# Patient Record
Sex: FEMALE | Race: BLACK OR AFRICAN AMERICAN | Employment: OTHER | URBAN - METROPOLITAN AREA
[De-identification: names, ages, dates, MRNs, and addresses within clinical notes are randomized per-mention and may not be internally consistent; named-entity substitution may affect disease eponyms.]

---

## 2017-01-01 ENCOUNTER — HOME CARE VISIT (OUTPATIENT)
Dept: HOSPICE | Facility: HOSPICE | Age: 82
End: 2017-01-01
Payer: MEDICARE

## 2017-01-01 ENCOUNTER — HOME CARE VISIT (OUTPATIENT)
Dept: SCHEDULING | Facility: HOME HEALTH | Age: 82
End: 2017-01-01
Payer: MEDICARE

## 2017-01-01 ENCOUNTER — HOSPITAL ENCOUNTER (INPATIENT)
Age: 82
LOS: 5 days | Discharge: HOME HEALTH | DRG: 190 | End: 2017-02-15
Attending: EMERGENCY MEDICINE | Admitting: FAMILY MEDICINE
Payer: MEDICARE

## 2017-01-01 ENCOUNTER — APPOINTMENT (OUTPATIENT)
Dept: GENERAL RADIOLOGY | Age: 82
DRG: 190 | End: 2017-01-01
Attending: EMERGENCY MEDICINE
Payer: MEDICARE

## 2017-01-01 ENCOUNTER — HOME CARE VISIT (OUTPATIENT)
Dept: HOME HEALTH SERVICES | Facility: HOME HEALTH | Age: 82
End: 2017-01-01
Payer: MEDICARE

## 2017-01-01 ENCOUNTER — OFFICE VISIT (OUTPATIENT)
Dept: CARDIOLOGY CLINIC | Age: 82
End: 2017-01-01

## 2017-01-01 ENCOUNTER — HOSPITAL ENCOUNTER (INPATIENT)
Age: 82
LOS: 4 days | Discharge: HOSPICE | DRG: 640 | End: 2017-04-15
Attending: EMERGENCY MEDICINE | Admitting: FAMILY MEDICINE
Payer: MEDICARE

## 2017-01-01 ENCOUNTER — HOME HEALTH ADMISSION (OUTPATIENT)
Dept: HOME HEALTH SERVICES | Facility: HOME HEALTH | Age: 82
End: 2017-01-01
Payer: MEDICARE

## 2017-01-01 ENCOUNTER — APPOINTMENT (OUTPATIENT)
Dept: GENERAL RADIOLOGY | Age: 82
DRG: 640 | End: 2017-01-01
Attending: EMERGENCY MEDICINE
Payer: MEDICARE

## 2017-01-01 ENCOUNTER — HOSPICE ADMISSION (OUTPATIENT)
Dept: HOSPICE | Facility: HOSPICE | Age: 82
End: 2017-01-01
Payer: MEDICARE

## 2017-01-01 VITALS — SYSTOLIC BLOOD PRESSURE: 133 MMHG | HEART RATE: 78 BPM | DIASTOLIC BLOOD PRESSURE: 72 MMHG | OXYGEN SATURATION: 95 %

## 2017-01-01 VITALS
SYSTOLIC BLOOD PRESSURE: 149 MMHG | OXYGEN SATURATION: 97 % | RESPIRATION RATE: 18 BRPM | SYSTOLIC BLOOD PRESSURE: 163 MMHG | RESPIRATION RATE: 18 BRPM | DIASTOLIC BLOOD PRESSURE: 81 MMHG | HEART RATE: 70 BPM | HEART RATE: 75 BPM | TEMPERATURE: 98.1 F | OXYGEN SATURATION: 99 % | DIASTOLIC BLOOD PRESSURE: 78 MMHG | TEMPERATURE: 97.9 F

## 2017-01-01 VITALS
SYSTOLIC BLOOD PRESSURE: 152 MMHG | RESPIRATION RATE: 18 BRPM | OXYGEN SATURATION: 99 % | HEART RATE: 80 BPM | DIASTOLIC BLOOD PRESSURE: 82 MMHG | TEMPERATURE: 97.8 F

## 2017-01-01 VITALS
HEIGHT: 65 IN | OXYGEN SATURATION: 87 % | DIASTOLIC BLOOD PRESSURE: 90 MMHG | RESPIRATION RATE: 16 BRPM | DIASTOLIC BLOOD PRESSURE: 50 MMHG | SYSTOLIC BLOOD PRESSURE: 140 MMHG | WEIGHT: 89 LBS | TEMPERATURE: 98.4 F | HEART RATE: 67 BPM | HEART RATE: 60 BPM | OXYGEN SATURATION: 96 % | BODY MASS INDEX: 14.83 KG/M2 | SYSTOLIC BLOOD PRESSURE: 102 MMHG

## 2017-01-01 VITALS
OXYGEN SATURATION: 98 % | TEMPERATURE: 98 F | SYSTOLIC BLOOD PRESSURE: 110 MMHG | HEART RATE: 64 BPM | DIASTOLIC BLOOD PRESSURE: 82 MMHG

## 2017-01-01 VITALS — DIASTOLIC BLOOD PRESSURE: 82 MMHG | SYSTOLIC BLOOD PRESSURE: 161 MMHG | HEART RATE: 78 BPM | OXYGEN SATURATION: 95 %

## 2017-01-01 VITALS
HEIGHT: 65 IN | HEART RATE: 60 BPM | DIASTOLIC BLOOD PRESSURE: 70 MMHG | TEMPERATURE: 97.8 F | RESPIRATION RATE: 18 BRPM | BODY MASS INDEX: 15.6 KG/M2 | SYSTOLIC BLOOD PRESSURE: 154 MMHG | OXYGEN SATURATION: 94 % | WEIGHT: 93.6 LBS

## 2017-01-01 VITALS
HEART RATE: 63 BPM | TEMPERATURE: 97.7 F | SYSTOLIC BLOOD PRESSURE: 131 MMHG | WEIGHT: 94.6 LBS | DIASTOLIC BLOOD PRESSURE: 65 MMHG | BODY MASS INDEX: 15.76 KG/M2 | HEIGHT: 65 IN | OXYGEN SATURATION: 100 % | RESPIRATION RATE: 20 BRPM

## 2017-01-01 VITALS — HEART RATE: 77 BPM | DIASTOLIC BLOOD PRESSURE: 81 MMHG | SYSTOLIC BLOOD PRESSURE: 150 MMHG | OXYGEN SATURATION: 99 %

## 2017-01-01 VITALS
OXYGEN SATURATION: 98 % | HEART RATE: 72 BPM | HEART RATE: 75 BPM | SYSTOLIC BLOOD PRESSURE: 125 MMHG | OXYGEN SATURATION: 95 % | SYSTOLIC BLOOD PRESSURE: 122 MMHG | DIASTOLIC BLOOD PRESSURE: 75 MMHG | DIASTOLIC BLOOD PRESSURE: 81 MMHG

## 2017-01-01 VITALS — DIASTOLIC BLOOD PRESSURE: 78 MMHG | SYSTOLIC BLOOD PRESSURE: 117 MMHG | OXYGEN SATURATION: 97 % | HEART RATE: 77 BPM

## 2017-01-01 VITALS
DIASTOLIC BLOOD PRESSURE: 76 MMHG | SYSTOLIC BLOOD PRESSURE: 98 MMHG | HEART RATE: 48 BPM | OXYGEN SATURATION: 78 % | RESPIRATION RATE: 12 BRPM

## 2017-01-01 VITALS — HEART RATE: 65 BPM | OXYGEN SATURATION: 96 % | DIASTOLIC BLOOD PRESSURE: 81 MMHG | SYSTOLIC BLOOD PRESSURE: 138 MMHG

## 2017-01-01 VITALS
HEART RATE: 74 BPM | SYSTOLIC BLOOD PRESSURE: 130 MMHG | HEIGHT: 65 IN | DIASTOLIC BLOOD PRESSURE: 60 MMHG | OXYGEN SATURATION: 98 %

## 2017-01-01 VITALS — OXYGEN SATURATION: 98 % | HEART RATE: 64 BPM | SYSTOLIC BLOOD PRESSURE: 138 MMHG | DIASTOLIC BLOOD PRESSURE: 62 MMHG

## 2017-01-01 VITALS
TEMPERATURE: 98.6 F | SYSTOLIC BLOOD PRESSURE: 110 MMHG | RESPIRATION RATE: 20 BRPM | DIASTOLIC BLOOD PRESSURE: 60 MMHG | HEART RATE: 76 BPM

## 2017-01-01 VITALS — DIASTOLIC BLOOD PRESSURE: 78 MMHG | SYSTOLIC BLOOD PRESSURE: 117 MMHG | OXYGEN SATURATION: 97 % | HEART RATE: 71 BPM

## 2017-01-01 VITALS — HEART RATE: 67 BPM | OXYGEN SATURATION: 95 % | DIASTOLIC BLOOD PRESSURE: 90 MMHG | SYSTOLIC BLOOD PRESSURE: 126 MMHG

## 2017-01-01 VITALS — HEART RATE: 89 BPM | DIASTOLIC BLOOD PRESSURE: 72 MMHG | SYSTOLIC BLOOD PRESSURE: 135 MMHG | RESPIRATION RATE: 20 BRPM

## 2017-01-01 DIAGNOSIS — R06.02 SOB (SHORTNESS OF BREATH): ICD-10-CM

## 2017-01-01 DIAGNOSIS — J84.10 FIBROTIC LUNG DISEASES (HCC): ICD-10-CM

## 2017-01-01 DIAGNOSIS — R26.2 INABILITY TO AMBULATE DUE TO MULTIPLE JOINTS: ICD-10-CM

## 2017-01-01 DIAGNOSIS — I42.0 DILATED CARDIOMYOPATHY (HCC): Primary | ICD-10-CM

## 2017-01-01 DIAGNOSIS — I45.9 STOKES-ADAMS SYNCOPE: ICD-10-CM

## 2017-01-01 DIAGNOSIS — I27.20 PULMONARY HYPERTENSION (HCC): ICD-10-CM

## 2017-01-01 DIAGNOSIS — I50.32 CHRONIC DIASTOLIC HEART FAILURE (HCC): ICD-10-CM

## 2017-01-01 DIAGNOSIS — J18.9 CAP (COMMUNITY ACQUIRED PNEUMONIA): Primary | ICD-10-CM

## 2017-01-01 DIAGNOSIS — R53.1 WEAKNESS GENERALIZED: Primary | ICD-10-CM

## 2017-01-01 LAB
ALBUMIN SERPL BCP-MCNC: 2.2 G/DL (ref 3.4–5)
ALBUMIN SERPL BCP-MCNC: 2.7 G/DL (ref 3.4–5)
ALBUMIN/GLOB SERPL: 0.3 {RATIO} (ref 0.8–1.7)
ALBUMIN/GLOB SERPL: 0.4 {RATIO} (ref 0.8–1.7)
ALP SERPL-CCNC: 69 U/L (ref 45–117)
ALP SERPL-CCNC: 87 U/L (ref 45–117)
ALT SERPL-CCNC: 12 U/L (ref 13–56)
ALT SERPL-CCNC: 14 U/L (ref 13–56)
ANION GAP BLD CALC-SCNC: 5 MMOL/L (ref 3–18)
ANION GAP BLD CALC-SCNC: 9 MMOL/L (ref 3–18)
APPEARANCE UR: CLEAR
APPEARANCE UR: CLEAR
AST SERPL W P-5'-P-CCNC: 21 U/L (ref 15–37)
AST SERPL W P-5'-P-CCNC: 24 U/L (ref 15–37)
ATRIAL RATE: 66 BPM
ATRIAL RATE: 89 BPM
BACTERIA SPEC CULT: NORMAL
BACTERIA URNS QL MICRO: ABNORMAL /HPF
BACTERIA URNS QL MICRO: NEGATIVE /HPF
BASOPHILS # BLD AUTO: 0 K/UL (ref 0–0.06)
BASOPHILS # BLD AUTO: 0 K/UL (ref 0–0.1)
BASOPHILS # BLD: 0 % (ref 0–2)
BASOPHILS # BLD: 0 % (ref 0–2)
BILIRUB SERPL-MCNC: 0.3 MG/DL (ref 0.2–1)
BILIRUB SERPL-MCNC: 0.4 MG/DL (ref 0.2–1)
BILIRUB UR QL: NEGATIVE
BILIRUB UR QL: NEGATIVE
BNP SERPL-MCNC: 4159 PG/ML (ref 0–1800)
BUN SERPL-MCNC: 18 MG/DL (ref 7–18)
BUN SERPL-MCNC: 25 MG/DL (ref 7–18)
BUN/CREAT SERPL: 16 (ref 12–20)
BUN/CREAT SERPL: 20 (ref 12–20)
CALCIUM SERPL-MCNC: 8.5 MG/DL (ref 8.5–10.1)
CALCIUM SERPL-MCNC: 9.5 MG/DL (ref 8.5–10.1)
CALCULATED P AXIS, ECG09: 2 DEGREES
CALCULATED P AXIS, ECG09: 43 DEGREES
CALCULATED R AXIS, ECG10: -14 DEGREES
CALCULATED R AXIS, ECG10: 8 DEGREES
CALCULATED T AXIS, ECG11: 145 DEGREES
CALCULATED T AXIS, ECG11: 151 DEGREES
CHLORIDE SERPL-SCNC: 105 MMOL/L (ref 100–108)
CHLORIDE SERPL-SCNC: 105 MMOL/L (ref 100–108)
CO2 SERPL-SCNC: 24 MMOL/L (ref 21–32)
CO2 SERPL-SCNC: 29 MMOL/L (ref 21–32)
COLOR UR: YELLOW
COLOR UR: YELLOW
CREAT SERPL-MCNC: 1.13 MG/DL (ref 0.6–1.3)
CREAT SERPL-MCNC: 1.23 MG/DL (ref 0.6–1.3)
DIAGNOSIS, 93000: NORMAL
DIAGNOSIS, 93000: NORMAL
DIFFERENTIAL METHOD BLD: ABNORMAL
DIFFERENTIAL METHOD BLD: ABNORMAL
EOSINOPHIL # BLD: 0 K/UL (ref 0–0.4)
EOSINOPHIL # BLD: 0 K/UL (ref 0–0.4)
EOSINOPHIL NFR BLD: 0 % (ref 0–5)
EOSINOPHIL NFR BLD: 0 % (ref 0–5)
EPITH CASTS URNS QL MICRO: ABNORMAL /LPF (ref 0–5)
EPITH CASTS URNS QL MICRO: ABNORMAL /LPF (ref 0–5)
ERYTHROCYTE [DISTWIDTH] IN BLOOD BY AUTOMATED COUNT: 13.4 % (ref 11.6–14.5)
ERYTHROCYTE [DISTWIDTH] IN BLOOD BY AUTOMATED COUNT: 14.5 % (ref 11.6–14.5)
FLUAV AG NPH QL IA: NEGATIVE
FLUBV AG NOSE QL IA: NEGATIVE
GLOBULIN SER CALC-MCNC: 6.6 G/DL (ref 2–4)
GLOBULIN SER CALC-MCNC: 7 G/DL (ref 2–4)
GLUCOSE BLD STRIP.AUTO-MCNC: 120 MG/DL (ref 70–110)
GLUCOSE BLD STRIP.AUTO-MCNC: 180 MG/DL (ref 70–110)
GLUCOSE BLD STRIP.AUTO-MCNC: 192 MG/DL (ref 70–110)
GLUCOSE BLD STRIP.AUTO-MCNC: 34 MG/DL (ref 70–110)
GLUCOSE BLD STRIP.AUTO-MCNC: 35 MG/DL (ref 70–110)
GLUCOSE BLD STRIP.AUTO-MCNC: 43 MG/DL (ref 70–110)
GLUCOSE BLD STRIP.AUTO-MCNC: 46 MG/DL (ref 70–110)
GLUCOSE BLD STRIP.AUTO-MCNC: 47 MG/DL (ref 70–110)
GLUCOSE BLD STRIP.AUTO-MCNC: 54 MG/DL (ref 70–110)
GLUCOSE BLD STRIP.AUTO-MCNC: 57 MG/DL (ref 70–110)
GLUCOSE BLD STRIP.AUTO-MCNC: 59 MG/DL (ref 70–110)
GLUCOSE BLD STRIP.AUTO-MCNC: 64 MG/DL (ref 70–110)
GLUCOSE BLD STRIP.AUTO-MCNC: 69 MG/DL (ref 70–110)
GLUCOSE BLD STRIP.AUTO-MCNC: 74 MG/DL (ref 70–110)
GLUCOSE BLD STRIP.AUTO-MCNC: 75 MG/DL (ref 70–110)
GLUCOSE BLD STRIP.AUTO-MCNC: 79 MG/DL (ref 70–110)
GLUCOSE BLD STRIP.AUTO-MCNC: 81 MG/DL (ref 70–110)
GLUCOSE BLD STRIP.AUTO-MCNC: 91 MG/DL (ref 70–110)
GLUCOSE BLD STRIP.AUTO-MCNC: 96 MG/DL (ref 70–110)
GLUCOSE BLD STRIP.AUTO-MCNC: <30 MG/DL (ref 70–110)
GLUCOSE SERPL-MCNC: 100 MG/DL (ref 74–99)
GLUCOSE SERPL-MCNC: 89 MG/DL (ref 74–99)
GLUCOSE UR STRIP.AUTO-MCNC: NEGATIVE MG/DL
GLUCOSE UR STRIP.AUTO-MCNC: NEGATIVE MG/DL
HCT VFR BLD AUTO: 32.3 % (ref 35–45)
HCT VFR BLD AUTO: 37.4 % (ref 35–45)
HGB BLD-MCNC: 10.7 G/DL (ref 12–16)
HGB BLD-MCNC: 12.2 G/DL (ref 12–16)
HGB UR QL STRIP: ABNORMAL
HGB UR QL STRIP: ABNORMAL
HYALINE CASTS URNS QL MICRO: ABNORMAL /LPF (ref 0–2)
KETONES UR QL STRIP.AUTO: NEGATIVE MG/DL
KETONES UR QL STRIP.AUTO: NEGATIVE MG/DL
LACTATE BLD-SCNC: 1.2 MMOL/L (ref 0.4–2)
LEUKOCYTE ESTERASE UR QL STRIP.AUTO: NEGATIVE
LEUKOCYTE ESTERASE UR QL STRIP.AUTO: NEGATIVE
LYMPHOCYTES # BLD AUTO: 18 % (ref 21–52)
LYMPHOCYTES # BLD AUTO: 25 % (ref 21–52)
LYMPHOCYTES # BLD: 1.2 K/UL (ref 0.9–3.6)
LYMPHOCYTES # BLD: 1.4 K/UL (ref 0.9–3.6)
MCH RBC QN AUTO: 29.6 PG (ref 24–34)
MCH RBC QN AUTO: 29.8 PG (ref 24–34)
MCHC RBC AUTO-ENTMCNC: 32.6 G/DL (ref 31–37)
MCHC RBC AUTO-ENTMCNC: 33.1 G/DL (ref 31–37)
MCV RBC AUTO: 89.2 FL (ref 74–97)
MCV RBC AUTO: 91.2 FL (ref 74–97)
MONOCYTES # BLD: 0.3 K/UL (ref 0.05–1.2)
MONOCYTES # BLD: 0.8 K/UL (ref 0.05–1.2)
MONOCYTES NFR BLD AUTO: 7 % (ref 3–10)
MONOCYTES NFR BLD AUTO: 9 % (ref 3–10)
MUCOUS THREADS URNS QL MICRO: ABNORMAL /LPF
NEUTS SEG # BLD: 3.5 K/UL (ref 1.8–8)
NEUTS SEG # BLD: 5.9 K/UL (ref 1.8–8)
NEUTS SEG NFR BLD AUTO: 68 % (ref 40–73)
NEUTS SEG NFR BLD AUTO: 73 % (ref 40–73)
NITRITE UR QL STRIP.AUTO: NEGATIVE
NITRITE UR QL STRIP.AUTO: POSITIVE
P-R INTERVAL, ECG05: 116 MS
P-R INTERVAL, ECG05: 136 MS
PH UR STRIP: 5.5 [PH] (ref 5–8)
PH UR STRIP: 6 [PH] (ref 5–8)
PLATELET # BLD AUTO: 193 K/UL (ref 135–420)
PLATELET # BLD AUTO: 237 K/UL (ref 135–420)
PMV BLD AUTO: 10.1 FL (ref 9.2–11.8)
PMV BLD AUTO: 10.3 FL (ref 9.2–11.8)
POTASSIUM SERPL-SCNC: 3.7 MMOL/L (ref 3.5–5.5)
POTASSIUM SERPL-SCNC: 3.8 MMOL/L (ref 3.5–5.5)
PROT SERPL-MCNC: 9.2 G/DL (ref 6.4–8.2)
PROT SERPL-MCNC: 9.3 G/DL (ref 6.4–8.2)
PROT UR STRIP-MCNC: 100 MG/DL
PROT UR STRIP-MCNC: >300 MG/DL
Q-T INTERVAL, ECG07: 382 MS
Q-T INTERVAL, ECG07: 470 MS
QRS DURATION, ECG06: 106 MS
QRS DURATION, ECG06: 126 MS
QTC CALCULATION (BEZET), ECG08: 464 MS
QTC CALCULATION (BEZET), ECG08: 492 MS
RBC # BLD AUTO: 3.62 M/UL (ref 4.2–5.3)
RBC # BLD AUTO: 4.1 M/UL (ref 4.2–5.3)
RBC #/AREA URNS HPF: NEGATIVE /HPF (ref 0–5)
SERVICE CMNT-IMP: NORMAL
SODIUM SERPL-SCNC: 138 MMOL/L (ref 136–145)
SODIUM SERPL-SCNC: 139 MMOL/L (ref 136–145)
SP GR UR REFRACTOMETRY: 1.02 (ref 1–1.03)
SP GR UR REFRACTOMETRY: >1.03 (ref 1–1.03)
UROBILINOGEN UR QL STRIP.AUTO: 0.2 EU/DL (ref 0.2–1)
UROBILINOGEN UR QL STRIP.AUTO: 0.2 EU/DL (ref 0.2–1)
VENTRICULAR RATE, ECG03: 66 BPM
VENTRICULAR RATE, ECG03: 89 BPM
WBC # BLD AUTO: 5.1 K/UL (ref 4.6–13.2)
WBC # BLD AUTO: 8.1 K/UL (ref 4.6–13.2)
WBC URNS QL MICRO: ABNORMAL /HPF (ref 0–4)
WBC URNS QL MICRO: NEGATIVE /HPF (ref 0–4)

## 2017-01-01 PROCEDURE — 74011250637 HC RX REV CODE- 250/637: Performed by: FAMILY MEDICINE

## 2017-01-01 PROCEDURE — A4927 NON-STERILE GLOVES: HCPCS

## 2017-01-01 PROCEDURE — 87804 INFLUENZA ASSAY W/OPTIC: CPT | Performed by: EMERGENCY MEDICINE

## 2017-01-01 PROCEDURE — 65660000000 HC RM CCU STEPDOWN

## 2017-01-01 PROCEDURE — 0651 HSPC ROUTINE HOME CARE

## 2017-01-01 PROCEDURE — 81001 URINALYSIS AUTO W/SCOPE: CPT | Performed by: EMERGENCY MEDICINE

## 2017-01-01 PROCEDURE — 83880 ASSAY OF NATRIURETIC PEPTIDE: CPT | Performed by: EMERGENCY MEDICINE

## 2017-01-01 PROCEDURE — G0300 HHS/HOSPICE OF LPN EA 15 MIN: HCPCS

## 2017-01-01 PROCEDURE — 97162 PT EVAL MOD COMPLEX 30 MIN: CPT

## 2017-01-01 PROCEDURE — T4541 LARGE DISPOSABLE UNDERPAD: HCPCS

## 2017-01-01 PROCEDURE — HOSPICE MEDICATION HC HH HOSPICE MEDICATION

## 2017-01-01 PROCEDURE — 3331090001 HH PPS REVENUE CREDIT

## 2017-01-01 PROCEDURE — 74011000250 HC RX REV CODE- 250: Performed by: FAMILY MEDICINE

## 2017-01-01 PROCEDURE — 3331090002 HH PPS REVENUE DEBIT

## 2017-01-01 PROCEDURE — 83605 ASSAY OF LACTIC ACID: CPT

## 2017-01-01 PROCEDURE — 82962 GLUCOSE BLOOD TEST: CPT

## 2017-01-01 PROCEDURE — 92610 EVALUATE SWALLOWING FUNCTION: CPT

## 2017-01-01 PROCEDURE — 74011000258 HC RX REV CODE- 258: Performed by: FAMILY MEDICINE

## 2017-01-01 PROCEDURE — 77010033678 HC OXYGEN DAILY

## 2017-01-01 PROCEDURE — G0151 HHCP-SERV OF PT,EA 15 MIN: HCPCS

## 2017-01-01 PROCEDURE — 96360 HYDRATION IV INFUSION INIT: CPT

## 2017-01-01 PROCEDURE — G0157 HHC PT ASSISTANT EA 15: HCPCS

## 2017-01-01 PROCEDURE — 80053 COMPREHEN METABOLIC PANEL: CPT | Performed by: EMERGENCY MEDICINE

## 2017-01-01 PROCEDURE — 87086 URINE CULTURE/COLONY COUNT: CPT | Performed by: EMERGENCY MEDICINE

## 2017-01-01 PROCEDURE — 97530 THERAPEUTIC ACTIVITIES: CPT

## 2017-01-01 PROCEDURE — A9270 NON-COVERED ITEM OR SERVICE: HCPCS

## 2017-01-01 PROCEDURE — G0299 HHS/HOSPICE OF RN EA 15 MIN: HCPCS

## 2017-01-01 PROCEDURE — 400013 HH SOC

## 2017-01-01 PROCEDURE — G0152 HHCP-SERV OF OT,EA 15 MIN: HCPCS

## 2017-01-01 PROCEDURE — 71010 XR CHEST PORT: CPT

## 2017-01-01 PROCEDURE — 99285 EMERGENCY DEPT VISIT HI MDM: CPT

## 2017-01-01 PROCEDURE — 74011000258 HC RX REV CODE- 258: Performed by: EMERGENCY MEDICINE

## 2017-01-01 PROCEDURE — 74011250636 HC RX REV CODE- 250/636: Performed by: FAMILY MEDICINE

## 2017-01-01 PROCEDURE — 74011250636 HC RX REV CODE- 250/636: Performed by: EMERGENCY MEDICINE

## 2017-01-01 PROCEDURE — T4523 ADULT SIZE BRIEF/DIAPER LG: HCPCS

## 2017-01-01 PROCEDURE — 3331090003 HH PPS REVENUE ADJ

## 2017-01-01 PROCEDURE — A6250 SKIN SEAL PROTECT MOISTURIZR: HCPCS

## 2017-01-01 PROCEDURE — 85025 COMPLETE CBC W/AUTO DIFF WBC: CPT | Performed by: EMERGENCY MEDICINE

## 2017-01-01 PROCEDURE — G0158 HHC OT ASSISTANT EA 15: HCPCS

## 2017-01-01 PROCEDURE — T4522 ADULT SIZE BRIEF/DIAPER MED: HCPCS

## 2017-01-01 PROCEDURE — 87040 BLOOD CULTURE FOR BACTERIA: CPT | Performed by: EMERGENCY MEDICINE

## 2017-01-01 PROCEDURE — 97166 OT EVAL MOD COMPLEX 45 MIN: CPT

## 2017-01-01 PROCEDURE — 93005 ELECTROCARDIOGRAM TRACING: CPT

## 2017-01-01 PROCEDURE — HHS10554 SHAMPOO/BODY WASH 8 OZ ALOE VESTA

## 2017-01-01 PROCEDURE — 74011000250 HC RX REV CODE- 250

## 2017-01-01 PROCEDURE — 97165 OT EVAL LOW COMPLEX 30 MIN: CPT

## 2017-01-01 PROCEDURE — 97161 PT EVAL LOW COMPLEX 20 MIN: CPT

## 2017-01-01 PROCEDURE — G0155 HHCP-SVS OF CSW,EA 15 MIN: HCPCS

## 2017-01-01 PROCEDURE — 74011250637 HC RX REV CODE- 250/637: Performed by: EMERGENCY MEDICINE

## 2017-01-01 PROCEDURE — 3336500001 HSPC ELECTION

## 2017-01-01 RX ORDER — SODIUM CHLORIDE 0.9 % (FLUSH) 0.9 %
5-10 SYRINGE (ML) INJECTION AS NEEDED
Status: DISCONTINUED | OUTPATIENT
Start: 2017-01-01 | End: 2017-01-01 | Stop reason: HOSPADM

## 2017-01-01 RX ORDER — TIMOLOL MALEATE 5 MG/ML
1 SOLUTION OPHTHALMIC DAILY
Status: DISCONTINUED | OUTPATIENT
Start: 2017-01-01 | End: 2017-01-01 | Stop reason: HOSPADM

## 2017-01-01 RX ORDER — ISOSORBIDE DINITRATE 20 MG/1
20 TABLET ORAL 2 TIMES DAILY
Status: DISCONTINUED | OUTPATIENT
Start: 2017-01-01 | End: 2017-01-01 | Stop reason: SDUPTHER

## 2017-01-01 RX ORDER — DEXTROSE MONOHYDRATE AND SODIUM CHLORIDE 5; .45 G/100ML; G/100ML
50 INJECTION, SOLUTION INTRAVENOUS CONTINUOUS
Status: DISCONTINUED | OUTPATIENT
Start: 2017-01-01 | End: 2017-01-01

## 2017-01-01 RX ORDER — AMLODIPINE BESYLATE 5 MG/1
5 TABLET ORAL
Status: COMPLETED | OUTPATIENT
Start: 2017-01-01 | End: 2017-01-01

## 2017-01-01 RX ORDER — DEXTROSE 50 % IN WATER (D50W) INTRAVENOUS SYRINGE
Status: COMPLETED
Start: 2017-01-01 | End: 2017-01-01

## 2017-01-01 RX ORDER — GABAPENTIN 300 MG/1
300 CAPSULE ORAL 3 TIMES DAILY
Status: DISCONTINUED | OUTPATIENT
Start: 2017-01-01 | End: 2017-01-01

## 2017-01-01 RX ORDER — CLONIDINE HYDROCHLORIDE 0.1 MG/1
0.1 TABLET ORAL EVERY 8 HOURS
Status: DISCONTINUED | OUTPATIENT
Start: 2017-01-01 | End: 2017-01-01

## 2017-01-01 RX ORDER — ISOSORBIDE DINITRATE 10 MG/1
20 TABLET ORAL 2 TIMES DAILY
Status: DISCONTINUED | OUTPATIENT
Start: 2017-01-01 | End: 2017-01-01 | Stop reason: HOSPADM

## 2017-01-01 RX ORDER — ISOSORBIDE DINITRATE 20 MG/1
20 TABLET ORAL 2 TIMES DAILY
Status: DISCONTINUED | OUTPATIENT
Start: 2017-01-01 | End: 2017-01-01 | Stop reason: HOSPADM

## 2017-01-01 RX ORDER — DEXTROSE MONOHYDRATE 100 MG/ML
1000 INJECTION, SOLUTION INTRAVENOUS CONTINUOUS
Status: DISCONTINUED | OUTPATIENT
Start: 2017-01-01 | End: 2017-01-01

## 2017-01-01 RX ORDER — ACETAMINOPHEN 325 MG/1
650 TABLET ORAL
Status: DISCONTINUED | OUTPATIENT
Start: 2017-01-01 | End: 2017-01-01 | Stop reason: HOSPADM

## 2017-01-01 RX ORDER — HYDROCODONE BITARTRATE AND ACETAMINOPHEN 5; 325 MG/1; MG/1
1 TABLET ORAL
Status: DISCONTINUED | OUTPATIENT
Start: 2017-01-01 | End: 2017-01-01 | Stop reason: HOSPADM

## 2017-01-01 RX ORDER — LEVOFLOXACIN 5 MG/ML
250 INJECTION, SOLUTION INTRAVENOUS EVERY 24 HOURS
Status: DISCONTINUED | OUTPATIENT
Start: 2017-01-01 | End: 2017-01-01 | Stop reason: HOSPADM

## 2017-01-01 RX ORDER — IPRATROPIUM BROMIDE AND ALBUTEROL SULFATE 2.5; .5 MG/3ML; MG/3ML
3 SOLUTION RESPIRATORY (INHALATION)
Status: DISCONTINUED | OUTPATIENT
Start: 2017-01-01 | End: 2017-01-01 | Stop reason: HOSPADM

## 2017-01-01 RX ORDER — LEVOFLOXACIN 500 MG/1
500 TABLET, FILM COATED ORAL DAILY
Qty: 5 TAB | Refills: 0 | Status: SHIPPED | OUTPATIENT
Start: 2017-01-01 | End: 2017-01-01

## 2017-01-01 RX ORDER — AMLODIPINE BESYLATE 5 MG/1
5 TABLET ORAL DAILY
Status: DISCONTINUED | OUTPATIENT
Start: 2017-01-01 | End: 2017-01-01 | Stop reason: HOSPADM

## 2017-01-01 RX ORDER — DEXTROSE MONOHYDRATE AND SODIUM CHLORIDE 5; .45 G/100ML; G/100ML
75 INJECTION, SOLUTION INTRAVENOUS CONTINUOUS
Status: DISCONTINUED | OUTPATIENT
Start: 2017-01-01 | End: 2017-01-01 | Stop reason: HOSPADM

## 2017-01-01 RX ORDER — CLONIDINE HYDROCHLORIDE 0.1 MG/1
0.1 TABLET ORAL
Status: DISCONTINUED | OUTPATIENT
Start: 2017-01-01 | End: 2017-01-01

## 2017-01-01 RX ORDER — DEXTROSE MONOHYDRATE AND SODIUM CHLORIDE 5; .45 G/100ML; G/100ML
75 INJECTION, SOLUTION INTRAVENOUS CONTINUOUS
Status: DISCONTINUED | OUTPATIENT
Start: 2017-01-01 | End: 2017-01-01

## 2017-01-01 RX ORDER — GABAPENTIN 100 MG/1
200 CAPSULE ORAL DAILY
Status: DISCONTINUED | OUTPATIENT
Start: 2017-01-01 | End: 2017-01-01 | Stop reason: HOSPADM

## 2017-01-01 RX ORDER — DEXTROSE 50 % IN WATER (D50W) INTRAVENOUS SYRINGE
25 AS NEEDED
Status: DISCONTINUED | OUTPATIENT
Start: 2017-01-01 | End: 2017-01-01 | Stop reason: HOSPADM

## 2017-01-01 RX ORDER — GABAPENTIN 300 MG/1
300 CAPSULE ORAL 3 TIMES DAILY
Status: DISCONTINUED | OUTPATIENT
Start: 2017-01-01 | End: 2017-01-01 | Stop reason: HOSPADM

## 2017-01-01 RX ORDER — ACETAMINOPHEN 325 MG/1
650 TABLET ORAL
Status: COMPLETED | OUTPATIENT
Start: 2017-01-01 | End: 2017-01-01

## 2017-01-01 RX ORDER — LEVOFLOXACIN 5 MG/ML
500 INJECTION, SOLUTION INTRAVENOUS ONCE
Status: COMPLETED | OUTPATIENT
Start: 2017-01-01 | End: 2017-01-01

## 2017-01-01 RX ADMIN — DEXTROSE 50 % IN WATER (D50W) INTRAVENOUS SYRINGE 25 G: at 07:43

## 2017-01-01 RX ADMIN — CLONIDINE HYDROCHLORIDE 0.1 MG: 0.1 TABLET ORAL at 09:40

## 2017-01-01 RX ADMIN — ISOSORBIDE DINITRATE 20 MG: 10 TABLET ORAL at 08:27

## 2017-01-01 RX ADMIN — ISOSORBIDE DINITRATE 20 MG: 10 TABLET ORAL at 10:04

## 2017-01-01 RX ADMIN — DEXTROSE MONOHYDRATE 25 G: 25 INJECTION, SOLUTION INTRAVENOUS at 07:43

## 2017-01-01 RX ADMIN — GABAPENTIN 300 MG: 300 CAPSULE ORAL at 23:41

## 2017-01-01 RX ADMIN — DEXTROSE MONOHYDRATE AND SODIUM CHLORIDE 50 ML/HR: 5; .45 INJECTION, SOLUTION INTRAVENOUS at 06:35

## 2017-01-01 RX ADMIN — ISOSORBIDE DINITRATE 20 MG: 10 TABLET ORAL at 18:27

## 2017-01-01 RX ADMIN — ISOSORBIDE DINITRATE 20 MG: 10 TABLET ORAL at 09:50

## 2017-01-01 RX ADMIN — SODIUM CHLORIDE 1158 ML: 900 INJECTION, SOLUTION INTRAVENOUS at 10:59

## 2017-01-01 RX ADMIN — SODIUM CHLORIDE 500 MG: 900 INJECTION, SOLUTION INTRAVENOUS at 11:51

## 2017-01-01 RX ADMIN — AMLODIPINE BESYLATE 5 MG: 5 TABLET ORAL at 09:39

## 2017-01-01 RX ADMIN — AMLODIPINE BESYLATE 5 MG: 5 TABLET ORAL at 10:16

## 2017-01-01 RX ADMIN — AMLODIPINE BESYLATE 5 MG: 5 TABLET ORAL at 11:50

## 2017-01-01 RX ADMIN — GABAPENTIN 300 MG: 300 CAPSULE ORAL at 09:18

## 2017-01-01 RX ADMIN — CEFTRIAXONE SODIUM 2 G: 2 INJECTION, POWDER, FOR SOLUTION INTRAMUSCULAR; INTRAVENOUS at 14:38

## 2017-01-01 RX ADMIN — TIMOLOL MALEATE 1 DROP: 5 SOLUTION, GEL FORMING, EXTENDED RELEASE OPHTHALMIC at 09:45

## 2017-01-01 RX ADMIN — TIMOLOL MALEATE 1 DROP: 5 SOLUTION, GEL FORMING, EXTENDED RELEASE OPHTHALMIC at 08:28

## 2017-01-01 RX ADMIN — ISOSORBIDE DINITRATE 20 MG: 20 TABLET ORAL at 18:30

## 2017-01-01 RX ADMIN — DEXTROSE MONOHYDRATE AND SODIUM CHLORIDE 50 ML/HR: 5; .45 INJECTION, SOLUTION INTRAVENOUS at 16:28

## 2017-01-01 RX ADMIN — DEXTROSE MONOHYDRATE AND SODIUM CHLORIDE 50 ML/HR: 5; .45 INJECTION, SOLUTION INTRAVENOUS at 17:02

## 2017-01-01 RX ADMIN — ISOSORBIDE DINITRATE 20 MG: 10 TABLET ORAL at 18:16

## 2017-01-01 RX ADMIN — SODIUM CHLORIDE 500 MG: 900 INJECTION, SOLUTION INTRAVENOUS at 12:16

## 2017-01-01 RX ADMIN — CLONIDINE HYDROCHLORIDE 0.1 MG: 0.1 TABLET ORAL at 00:15

## 2017-01-01 RX ADMIN — ACETAMINOPHEN 650 MG: 325 TABLET, FILM COATED ORAL at 11:28

## 2017-01-01 RX ADMIN — ACETAMINOPHEN 650 MG: 325 TABLET, FILM COATED ORAL at 01:03

## 2017-01-01 RX ADMIN — AMLODIPINE BESYLATE 5 MG: 5 TABLET ORAL at 08:28

## 2017-01-01 RX ADMIN — ISOSORBIDE DINITRATE 20 MG: 20 TABLET ORAL at 14:36

## 2017-01-01 RX ADMIN — GABAPENTIN 300 MG: 300 CAPSULE ORAL at 10:04

## 2017-01-01 RX ADMIN — GABAPENTIN 200 MG: 100 CAPSULE ORAL at 10:14

## 2017-01-01 RX ADMIN — ACETAMINOPHEN 650 MG: 325 TABLET, FILM COATED ORAL at 07:17

## 2017-01-01 RX ADMIN — GABAPENTIN 300 MG: 300 CAPSULE ORAL at 09:50

## 2017-01-01 RX ADMIN — GABAPENTIN 300 MG: 300 CAPSULE ORAL at 17:01

## 2017-01-01 RX ADMIN — DEXTROSE MONOHYDRATE AND SODIUM CHLORIDE 50 ML/HR: 5; .45 INJECTION, SOLUTION INTRAVENOUS at 18:00

## 2017-01-01 RX ADMIN — CEFTRIAXONE SODIUM 2 G: 2 INJECTION, POWDER, FOR SOLUTION INTRAMUSCULAR; INTRAVENOUS at 14:14

## 2017-01-01 RX ADMIN — ISOSORBIDE DINITRATE 20 MG: 20 TABLET ORAL at 10:14

## 2017-01-01 RX ADMIN — TIMOLOL MALEATE 1 DROP: 5 SOLUTION, GEL FORMING, EXTENDED RELEASE OPHTHALMIC at 13:07

## 2017-01-01 RX ADMIN — ISOSORBIDE DINITRATE 20 MG: 20 TABLET ORAL at 09:40

## 2017-01-01 RX ADMIN — ACETAMINOPHEN 650 MG: 325 TABLET, FILM COATED ORAL at 11:50

## 2017-01-01 RX ADMIN — ACETAMINOPHEN 650 MG: 325 TABLET, FILM COATED ORAL at 10:19

## 2017-01-01 RX ADMIN — GABAPENTIN 300 MG: 300 CAPSULE ORAL at 19:00

## 2017-01-01 RX ADMIN — GABAPENTIN 300 MG: 300 CAPSULE ORAL at 18:27

## 2017-01-01 RX ADMIN — TIMOLOL MALEATE 1 DROP: 5 SOLUTION, GEL FORMING, EXTENDED RELEASE OPHTHALMIC at 09:40

## 2017-01-01 RX ADMIN — TIMOLOL MALEATE 1 DROP: 5 SOLUTION, GEL FORMING, EXTENDED RELEASE OPHTHALMIC at 10:16

## 2017-01-01 RX ADMIN — GABAPENTIN 300 MG: 300 CAPSULE ORAL at 17:03

## 2017-01-01 RX ADMIN — CLONIDINE HYDROCHLORIDE 0.1 MG: 0.1 TABLET ORAL at 23:41

## 2017-01-01 RX ADMIN — GABAPENTIN 300 MG: 300 CAPSULE ORAL at 22:04

## 2017-01-01 RX ADMIN — DEXTROSE MONOHYDRATE 1000 ML: 10 INJECTION, SOLUTION INTRAVENOUS at 09:53

## 2017-01-01 RX ADMIN — ISOSORBIDE DINITRATE 20 MG: 10 TABLET ORAL at 23:04

## 2017-01-01 RX ADMIN — HYDROCODONE BITARTRATE AND ACETAMINOPHEN 1 TABLET: 5; 325 TABLET ORAL at 06:13

## 2017-01-01 RX ADMIN — DEXTROSE MONOHYDRATE AND SODIUM CHLORIDE 50 ML/HR: 5; .45 INJECTION, SOLUTION INTRAVENOUS at 13:00

## 2017-01-01 RX ADMIN — SODIUM CHLORIDE 500 MG: 900 INJECTION, SOLUTION INTRAVENOUS at 11:49

## 2017-01-01 RX ADMIN — HYDROCODONE BITARTRATE AND ACETAMINOPHEN 1 TABLET: 5; 325 TABLET ORAL at 13:07

## 2017-01-01 RX ADMIN — GABAPENTIN 300 MG: 300 CAPSULE ORAL at 08:28

## 2017-01-01 RX ADMIN — LEVOFLOXACIN 250 MG: 5 INJECTION, SOLUTION INTRAVENOUS at 16:14

## 2017-01-01 RX ADMIN — CLONIDINE HYDROCHLORIDE 0.1 MG: 0.1 TABLET ORAL at 16:53

## 2017-01-01 RX ADMIN — AMLODIPINE BESYLATE 5 MG: 5 TABLET ORAL at 09:18

## 2017-01-01 RX ADMIN — ISOSORBIDE DINITRATE 20 MG: 20 TABLET ORAL at 13:07

## 2017-01-01 RX ADMIN — TIMOLOL MALEATE 1 DROP: 5 SOLUTION, GEL FORMING, EXTENDED RELEASE OPHTHALMIC at 10:30

## 2017-01-01 RX ADMIN — GABAPENTIN 300 MG: 300 CAPSULE ORAL at 17:28

## 2017-01-01 RX ADMIN — LEVOFLOXACIN 500 MG: 5 INJECTION, SOLUTION INTRAVENOUS at 14:09

## 2017-01-01 RX ADMIN — SODIUM CHLORIDE 500 MG: 900 INJECTION, SOLUTION INTRAVENOUS at 13:01

## 2017-01-01 RX ADMIN — DEXTROSE MONOHYDRATE AND SODIUM CHLORIDE 50 ML/HR: 5; .45 INJECTION, SOLUTION INTRAVENOUS at 12:20

## 2017-01-01 RX ADMIN — AMLODIPINE BESYLATE 5 MG: 5 TABLET ORAL at 13:07

## 2017-01-01 RX ADMIN — ACETAMINOPHEN 650 MG: 325 TABLET, FILM COATED ORAL at 17:04

## 2017-01-01 RX ADMIN — DEXTROSE MONOHYDRATE 25 G: 25 INJECTION, SOLUTION INTRAVENOUS at 08:10

## 2017-01-01 RX ADMIN — GABAPENTIN 200 MG: 100 CAPSULE ORAL at 09:39

## 2017-01-01 RX ADMIN — ISOSORBIDE DINITRATE 20 MG: 10 TABLET ORAL at 10:15

## 2017-01-01 RX ADMIN — GABAPENTIN 300 MG: 300 CAPSULE ORAL at 21:44

## 2017-01-01 RX ADMIN — CLONIDINE HYDROCHLORIDE 0.1 MG: 0.1 TABLET ORAL at 13:07

## 2017-01-01 RX ADMIN — ISOSORBIDE DINITRATE 20 MG: 10 TABLET ORAL at 17:03

## 2017-01-01 RX ADMIN — AMLODIPINE BESYLATE 5 MG: 5 TABLET ORAL at 10:14

## 2017-01-01 RX ADMIN — TIMOLOL MALEATE 1 DROP: 5 SOLUTION, GEL FORMING, EXTENDED RELEASE OPHTHALMIC at 09:50

## 2017-01-01 RX ADMIN — CLONIDINE HYDROCHLORIDE 0.1 MG: 0.1 TABLET ORAL at 10:14

## 2017-01-01 RX ADMIN — DEXTROSE MONOHYDRATE AND SODIUM CHLORIDE 75 ML/HR: 5; .45 INJECTION, SOLUTION INTRAVENOUS at 02:50

## 2017-01-01 RX ADMIN — ACETAMINOPHEN 650 MG: 325 TABLET, FILM COATED ORAL at 13:01

## 2017-01-01 RX ADMIN — ISOSORBIDE DINITRATE 20 MG: 10 TABLET ORAL at 09:18

## 2017-01-01 RX ADMIN — AMLODIPINE BESYLATE 5 MG: 5 TABLET ORAL at 09:50

## 2017-01-01 RX ADMIN — GABAPENTIN 300 MG: 300 CAPSULE ORAL at 16:14

## 2017-01-01 RX ADMIN — TIMOLOL MALEATE 1 DROP: 5 SOLUTION, GEL FORMING, EXTENDED RELEASE OPHTHALMIC at 10:15

## 2017-01-01 RX ADMIN — GABAPENTIN 300 MG: 300 CAPSULE ORAL at 22:44

## 2017-01-01 RX ADMIN — GABAPENTIN 300 MG: 300 CAPSULE ORAL at 13:07

## 2017-01-01 RX ADMIN — AMLODIPINE BESYLATE 5 MG: 5 TABLET ORAL at 10:04

## 2017-01-01 RX ADMIN — GABAPENTIN 300 MG: 300 CAPSULE ORAL at 23:32

## 2017-01-01 RX ADMIN — CEFTRIAXONE SODIUM 2 G: 2 INJECTION, POWDER, FOR SOLUTION INTRAMUSCULAR; INTRAVENOUS at 13:50

## 2017-01-01 RX ADMIN — ISOSORBIDE DINITRATE 20 MG: 10 TABLET ORAL at 17:28

## 2017-01-01 RX ADMIN — GABAPENTIN 300 MG: 300 CAPSULE ORAL at 10:16

## 2017-01-01 RX ADMIN — GABAPENTIN 300 MG: 300 CAPSULE ORAL at 23:04

## 2017-01-01 RX ADMIN — GABAPENTIN 300 MG: 300 CAPSULE ORAL at 00:11

## 2017-01-01 RX ADMIN — CLONIDINE HYDROCHLORIDE 0.1 MG: 0.1 TABLET ORAL at 00:11

## 2017-01-01 RX ADMIN — DEXTROSE 50 % IN WATER (D50W) INTRAVENOUS SYRINGE 25 G: at 08:10

## 2017-01-19 NOTE — PROGRESS NOTES
1. Have you been to the ER, urgent care clinic since your last visit? Hospitalized since your last visit? NO  2. Have you seen or consulted any other health care providers outside of the [address] [place] [address] since your last visit? Include any pap smears or colon screening.  No

## 2017-01-19 NOTE — PROGRESS NOTES
HISTORY OF PRESENT ILLNESS  Irasema Berrios is a 80 y.o. female. HPI  She has been feeling reasonably well. She denies cardiac symptoms. She has had no chest pain, dyspnea, orthopnea or PND. She has had no palpitations, dizziness or syncope. She has had no recurrent syncope whatsoever since the betablocker was discontinued. 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 She has a history of a dilated cardiomyopathy with an EF in the range of 35% to 40% by echocardiogram. She had a negative thallium myocardial scanning in past; therefore, it was felt that her cardiomyopathy was most likely nonischemic in nature. She also has fibrotic lung disease, which has been followed by Dr. Stephanie Mabry. She was once on Coreg, which has been changed to Toprol-XL in the past for some reason, which I suspect was related to a cost issue. She has tolerated 100 mg of Toprol-XL well.    Her echocardiogram from May 6, 2004, demonstrated normal left ventricular size with mildly depressed systolic function with EF in the range of 40-45%. There was some evidence of abnormal diastolic relaxation. She underwent an adenosine Cardiolite myocardial perfusion scan on December 16, 2005, to further evaluate chest pain, which was a normal perfusion scan with no evidence of scarring or ischemia. Ejection fraction was estimated in the 58% range. She underwent Lexiscan Cardiolite myocardial perfusion scan on December 14, 2009 which demonstrated normal perfusion imaging. Ejection fraction was estimated at 63%. She was admitted to the hospital on 11/12/14 with syncope. The work up was negative. Subsequently, implantable loop recorder was implanted on 11/12/14. She has not been eating or drinking properly. She has frequent episodes of diarrhea.  Her nutritional status appears to be inadequate and her serum albumin is down to 2.4 in November 2014 and 2.5 in January, 2015.    I interrogated her loop recorder which demonstrated severe bradycardia with complete heart block lasting for 7 seconds on 5/8/15 and she had another episode which lasted for 4 seconds.    She has since been off B blocker with no recurrent heart block. Review of Systems   Constitutional: Negative for malaise/fatigue and weight loss. HENT: Negative for hearing loss. Eyes:        Blind   Respiratory: Negative for shortness of breath. Cardiovascular: Negative for chest pain, palpitations, orthopnea, claudication, leg swelling and PND. Gastrointestinal: Negative for blood in stool, heartburn and melena. Genitourinary: Negative for dysuria, frequency, hematuria and urgency. Musculoskeletal: Negative for back pain and joint pain. Skin: Negative for itching and rash. Neurological: Negative for dizziness, loss of consciousness, weakness and headaches. Psychiatric/Behavioral: Negative for depression and memory loss. Physical Exam   Constitutional: She is oriented to person, place, and time. She appears well-developed and well-nourished. HENT:   Head: Normocephalic and atraumatic. Eyes:   blind   Neck: Normal range of motion. Neck supple. No JVD present. Cardiovascular: Regular rhythm, S1 normal and S2 normal.   Extrasystoles are present. PMI is not displaced. Exam reveals no gallop and no friction rub. No murmur heard. Pulses:       Carotid pulses are 3+ on the right side, and 3+ on the left side. Pulmonary/Chest: Effort normal. She has rales. Abdominal: Soft. There is no tenderness. Musculoskeletal: She exhibits no edema. Neurological: She is alert and oriented to person, place, and time. No cranial nerve deficit. Skin: Skin is warm and dry. Psychiatric: She has a normal mood and affect.  Her behavior is normal.     Visit Vitals    /60    Pulse 74    Ht 5' 5\" (1.651 m)    SpO2 98%       Past Medical History   Diagnosis Date    Arthritis     Atypical chest pain     CAD (coronary artery disease)     Cardiac echocardiogram 11/13/2014     EF 60%. No RWMA. Mild-mod LVH. Gr 1 DDfx. High ventricular filling pressure. RVSP 45 mmHg. Mod-severe LAE. Mild MR.  Mild-mod TR.  Cardiac nuclear imaging test 12/14/2009     No evidence of ischemia or infarction. Mild prox inferior, inferoseptal hypk. EF 63%.  Carotid duplex 11/13/2014     Mild < 50% bilateral ICA stenosis. Significant LECA tortuousity.  [facility] heart failure) ([facility])     Diabetes [facility]     Dilated cardiomyopathy ([facility])      EF now improved to 58% by gated SPECT imaging in December 2005    Fibrotic lung diseases ([facility]     Hypertension        Social History     Social History    Marital status: SINGLE     Spouse name: N/A    Number of children: N/A    Years of education: N/A     Occupational History    Not on file. Social History Main Topics    Smoking status: Former Smoker     Packs/day: 0.25     Years: 10.00     Quit date: 3/2/1971    Smokeless tobacco: Never Used    Alcohol use No    Drug use: No    Sexual activity: Not on file     Other Topics Concern    Not on file     Social History Narrative       Family History   Problem Relation Age of Onset    Heart Disease Mother     Heart Disease Sister     Heart Disease Brother     Diabetes Brother     Diabetes Sister        Past Surgical History   Procedure Laterality Date    Pr biopsy of breast, incisional       left breast       Current Outpatient Prescriptions   Medication Sig Dispense Refill    amLODIPine (NORVASC) 5 mg tablet TAKE ONE TABLET BY MOUTH ONCE DAILY 30 Tab 6    isosorbide dinitrate (ISORDIL) 20 mg tablet Take 1 Tab by mouth two (2) times a day. 60 Tab 6    HYDROcodone-acetaminophen (VICODIN) 5-500 mg per tablet Take  by mouth as needed for Pain.  albuterol-ipratropium (DUO-NEB) 2.5 mg-0.5 mg/3 ml nebulizer solution 3 mL by Nebulization route as needed for Wheezing.  gabapentin (NEURONTIN) 300 mg capsule Take 300 mg by mouth three (3) times daily.  timolol (TIMOPTIC-XR) 0.5 % ophthalmic gel-forming Administer 1 Drop to both eyes daily.  diazepam (VALIUM) 5 mg tablet Take 5 mg by mouth once as needed. EKG: unchanged from previous tracings, normal sinus rhythm, LBBB, occasional PVC noted, unifocal.    ASSESSMENT and PLAN  Encounter Diagnoses   Name Primary?  Dilated cardiomyopathy/EF now improved to 58% by gated SPECT imaging in December 2005. Yes    Chronic diastolic heart failure (HCC)     Fibrotic lung diseases (HCC)     Pulmonary hypertension,moderate     SOB (shortness of breath)     Perez-Vázquez syncope    She has been doing quite well. She has had no symptoms to indicate cardiac decompensation. She has had no recurrent bradycardia or heart block since the betablocker was discontinued and there has been no recurrent syncope. She appears to be doing as well as she could be with her advanced age and total blindness. For now, she will be continued on her current medical regimen.

## 2017-02-10 PROBLEM — J18.9 CAP (COMMUNITY ACQUIRED PNEUMONIA): Status: ACTIVE | Noted: 2017-01-01

## 2017-02-10 NOTE — H&P
History and Physical    Patient: Rajinder Cormier MRN: [id number]  SSN: [id number]    YOB: 1926  Age: 80 y.o. Sex: female      Subjective:      Rajinder Cormier is a 80 y.o. female who has known cardiomyopathy with fibrotic lung disease that has been having cough with SOB and fever for the last few days. Patient did not improve with nebulizer treatments so brought to the ER. Patient with cough, fever. Will admit for pneumonia. Past Medical History   Diagnosis Date    Arthritis     Atypical chest pain     CAD (coronary artery disease)     Cardiac echocardiogram 11/13/2014     EF 60%. No RWMA. Mild-mod LVH. Gr 1 DDfx. High ventricular filling pressure. RVSP 45 mmHg. Mod-severe LAE. Mild MR.  Mild-mod TR.  Cardiac nuclear imaging test 12/14/2009     No evidence of ischemia or infarction. Mild prox inferior, inferoseptal hypk. EF 63%.  Carotid duplex 11/13/2014     Mild < 50% bilateral ICA stenosis. Significant LECA tortuousity.  CHF (congestive heart failure) (HCC)     Diabetes mellitus ([address]     Dilated cardiomyopathy (HCC)      EF now improved to 58% by gated SPECT imaging in December 2005    Fibrotic lung diseases ([facility]     Hypertension      Past Surgical History   Procedure Laterality Date    Pr biopsy of breast, incisional       left breast      Family History   Problem Relation Age of Onset    Heart Disease Mother     Heart Disease Sister     Heart Disease Brother     Diabetes Brother     Diabetes Sister      Social History   Substance Use Topics    Smoking status: Former Smoker     Packs/day: 0.25     Years: 10.00     Quit date: 3/2/1971    Smokeless tobacco: Never Used    Alcohol use No      Prior to Admission medications    Medication Sig Start Date End Date Taking?  Authorizing Provider   amLODIPine (NORVASC) 5 mg tablet TAKE ONE TABLET BY MOUTH ONCE DAILY 8/9/16  Yes Isidoro Fink MD   isosorbide dinitrate (ISORDIL) 20 mg tablet Take 1 Tab by mouth two (2) times a day. 12/22/15  Yes Long Chaidez MD   HYDROcodone-acetaminophen (VICODIN) 5-500 mg per tablet Take  by mouth as needed for Pain. Yes Historical Provider   gabapentin (NEURONTIN) 300 mg capsule Take 300 mg by mouth three (3) times daily. Yes Historical Provider   timolol (TIMOPTIC-XR) 0.5 % ophthalmic gel-forming Administer 1 Drop to both eyes daily. Yes Historical Provider   diazepam (VALIUM) 5 mg tablet Take 5 mg by mouth once as needed. 3/2/11  Yes Historical Provider   albuterol-ipratropium (DUO-NEB) 2.5 mg-0.5 mg/3 ml nebulizer solution 3 mL by Nebulization route as needed for Wheezing. Historical Provider        Allergies   Allergen Reactions    Aspirin Other (comments)     Severe nosebleeds    Other Medication Hives     Any CILLINS (HIVES)       Review of Systems:  A comprehensive review of systems was negative except for that written in the History of Present Illness. Objective:     Vitals:    02/10/17 1300 02/10/17 1315 02/10/17 1330 02/10/17 1339   BP: 158/79 124/67 151/57    Pulse: 90 90 91    Resp: 29 29 25    Temp:    100 °F (37.8 °C)   SpO2: 96% 95% 95%    Weight:            Physical Exam:  GENERAL: alert, cooperative, mild distress, appears stated age  LUNG: rhonchi R base, L base  HEART: regular rate and rhythm, S1, S2 normal, no murmur, click, rub or gallop  ABDOMEN: soft, non-tender. Bowel sounds normal. No masses,  no organomegaly    Assessment:     Hospital Problems  Date Reviewed: 2/10/2017          Codes Class Noted POA    CAP (community acquired pneumonia) ICD-10-CM: J18.9  ICD-9-CM: 466  2/10/2017 Unknown        Chronic diastolic heart failure ([facility] ICD-10-CM: I50.32  ICD-9-CM: 428.32  1/15/2013 Yes        Hypertension ICD-10-CM: I10  ICD-9-CM: 401.9  Unknown Yes        Fibrotic lung diseases ([facility] ICD-10-CM: J84.10  ICD-9-CM: 397  Unknown Yes              Plan:     [facility] blood [facility]  Nebulizer treatments. IV antibiotics.     Signed By: Daniel Mariscal MD     February 10, 2017

## 2017-02-10 NOTE — ED PROVIDER NOTES
HPI Comments: 10:33 AM Ivy Kingsley is a 80 y.o. Female with a history of DM,  presenting to the ED with productive cough that began 4 days ago. The patient's daughter also reports fever and the patient reports generalized arthralgias, and HA as associated symptoms. EMS reports a fever of 102F and rales in all fields en route. Her daughter states she called her PCP and scheduled her an appointment for 02/13/17 but the patient was so fussy and the cough was not resolving so she called EMS for her to be seen this morning. Pt denies nausea, vomiting, diarrhea, and CP. Her daughter gave her OTC medication for the cough and fever with no relief of the cough and mild relief of the fever. Her daughter denies any recent hospitalizations, or sick contacts, and reports that she did have her flu vaccine this year. No other complaints at this time. The history is provided by the EMS personnel and a relative. Past Medical History:   Diagnosis Date    Arthritis     Atypical chest pain     CAD (coronary artery disease)     Cardiac echocardiogram 11/13/2014     EF 60%. No RWMA. Mild-mod LVH. Gr 1 DDfx. High ventricular filling pressure. RVSP 45 mmHg. Mod-severe LAE. Mild MR.  Mild-mod TR.  Cardiac nuclear imaging test 12/14/2009     No evidence of ischemia or infarction. Mild prox inferior, inferoseptal hypk. EF 63%.  Carotid duplex 11/13/2014     Mild < 50% bilateral ICA stenosis. Significant LECA tortuousity.       CHF (congestive heart failure) (HCC)     Diabetes mellitus ([address]     Dilated cardiomyopathy (HCC)      EF now improved to 58% by gated SPECT imaging in December 2005    Fibrotic lung diseases ([address]     Hypertension        Past Surgical History:   Procedure Laterality Date    Pr biopsy of breast, incisional       left breast         Family History:   Problem Relation Age of Onset    Heart Disease Mother     Heart Disease Sister     Heart Disease Brother     Diabetes Brother     Diabetes Sister        Social History     Social History    Marital status: SINGLE     Spouse name: N/A    Number of children: N/A    Years of education: N/A     Occupational History    Not on file. Social History Main Topics    Smoking status: Former Smoker     Packs/day: 0.25     Years: 10.00     Quit date: 3/2/1971    Smokeless tobacco: Never Used    Alcohol use No    Drug use: No    Sexual activity: Not on file     Other Topics Concern    Not on file     Social History Narrative         ALLERGIES: Aspirin and Other medication    Review of Systems   Constitutional: Positive for fever. Negative for chills. HENT: Negative for congestion and sneezing. Eyes: Negative for visual disturbance. Respiratory: Positive for cough. Negative for shortness of breath. Cardiovascular: Negative for chest pain. Gastrointestinal: Negative for abdominal pain, diarrhea, nausea and vomiting. Genitourinary: Negative for difficulty urinating and dysuria. Musculoskeletal: Positive for arthralgias. Negative for back pain. Skin: Negative for rash. Neurological: Positive for headaches. Negative for weakness. Vitals:    02/10/17 1038 02/10/17 1045 02/10/17 1053 02/10/17 1100   BP: (!) 153/93 181/76  178/70   Pulse: 92 92  86   Resp: 26 27  28   Temp: 99.5 °F (37.5 °C)  (!) 101.9 °F (38.8 °C)    SpO2: 94% 93%     Weight: 39 kg (86 lb)               Physical Exam   Constitutional: She is oriented to person, place, and time. She appears well-developed. Thin, appears frail   HENT:   Head: Normocephalic and atraumatic. Neck: Neck supple. No JVD present. Cardiovascular: Normal rate and regular rhythm. Pulmonary/Chest: No respiratory distress. Coarse rhonci in all fields, tachypnea   Abdominal: Soft. She exhibits no distension. There is no tenderness. There is no rebound and no guarding. Musculoskeletal: She exhibits no edema.    Neurological: She is alert and oriented to person, place, and time. No cranial nerve deficit. Skin: Skin is warm and dry. No erythema. Psychiatric: Judgment normal.        MDM  Number of Diagnoses or Management Options  Diagnosis management comments: 79y/o female presents with fever and cough  Sepsis protocol initiated  Pt with hx of CHF, will closesly observe after IVF bolus. Amount and/or Complexity of Data Reviewed  Clinical lab tests: ordered and reviewed  Tests in the radiology section of CPT®: ordered and reviewed  Tests in the medicine section of CPT®: reviewed and ordered      ED Course       Procedures    Vitals:  Patient Vitals for the past 12 hrs:   Temp Pulse Resp BP SpO2   02/10/17 1100 - 86 28 178/70 -   02/10/17 1053 (!) 101.9 °F (38.8 °C) - - - -   02/10/17 1045 - 92 27 181/76 93 %   02/10/17 1038 99.5 °F (37.5 °C) 92 26 (!) 153/93 94 %   Pulsox interpreted within normal limits.        Medications ordered:   Medications   sodium chloride (NS) flush 5-10 mL (not administered)   cefTRIAXone (ROCEPHIN) 2 g in 0.9% sodium chloride (MBP/ADV) 50 mL MBP (not administered)   azithromycin (ZITHROMAX) 500 mg in 0.9% sodium chloride (MBP/ADV) 250 mL adv (not administered)   amLODIPine (NORVASC) tablet 5 mg (not administered)   isosorbide dinitrate (ISORDIL) tablet 20 mg (not administered)   sodium chloride 0.9 % bolus infusion 1,158 mL (1,158 mL IntraVENous New Bag 2/10/17 1059)   acetaminophen (TYLENOL) tablet 650 mg (650 mg Oral Given 2/10/17 1128)         Lab findings:  Recent Results (from the past 12 hour(s))   METABOLIC PANEL, COMPREHENSIVE    Collection Time: 02/10/17 10:30 AM   Result Value Ref Range    Sodium 138 136 - 145 mmol/L    Potassium 3.7 3.5 - 5.5 mmol/L    Chloride 105 100 - 108 mmol/L    CO2 24 21 - 32 mmol/L    Anion gap 9 3.0 - 18 mmol/L    Glucose 89 74 - 99 mg/dL    BUN 18 7.0 - 18 MG/DL    Creatinine 1.13 0.6 - 1.3 MG/DL    BUN/Creatinine ratio 16 12 - 20      GFR est AA 55 (L) >60 ml/min/1.73m2    GFR est non-AA 45 (L) >60 ml/min/1.73m2    Calcium 8.5 8.5 - 10.1 MG/DL    Bilirubin, total 0.4 0.2 - 1.0 MG/DL    ALT (SGPT) 14 13 - 56 U/L    AST (SGOT) 21 15 - 37 U/L    Alk. phosphatase 87 45 - 117 U/L    Protein, total 9.2 (H) 6.4 - 8.2 g/dL    Albumin 2.2 (L) 3.4 - 5.0 g/dL    Globulin 7.0 (H) 2.0 - 4.0 g/dL    A-G Ratio 0.3 (L) 0.8 - 1.7     CBC WITH AUTOMATED DIFF    Collection Time: 02/10/17 10:30 AM   Result Value Ref Range    WBC 8.1 4.6 - 13.2 K/uL    RBC 4.10 (L) 4.20 - 5.30 M/uL    HGB 12.2 12.0 - 16.0 g/dL    HCT 37.4 35.0 - 45.0 %    MCV 91.2 74.0 - 97.0 FL    MCH 29.8 24.0 - 34.0 PG    MCHC 32.6 31.0 - 37.0 g/dL    RDW 13.4 11.6 - 14.5 %    PLATELET 588 080 - 515 K/uL    MPV 10.3 9.2 - 11.8 FL    NEUTROPHILS 73 40 - 73 %    LYMPHOCYTES 18 (L) 21 - 52 %    MONOCYTES 9 3 - 10 %    EOSINOPHILS 0 0 - 5 %    BASOPHILS 0 0 - 2 %    ABS. NEUTROPHILS 5.9 1.8 - 8.0 K/UL    ABS. LYMPHOCYTES 1.4 0.9 - 3.6 K/UL    ABS. MONOCYTES 0.8 0.05 - 1.2 K/UL    ABS. EOSINOPHILS 0.0 0.0 - 0.4 K/UL    ABS.  BASOPHILS 0.0 0.0 - 0.06 K/UL    DF AUTOMATED     PRO-BNP    Collection Time: 02/10/17 10:30 AM   Result Value Ref Range    NT pro-BNP 4159 (H) 0 - 1800 PG/ML   POC LACTIC ACID    Collection Time: 02/10/17 10:43 AM   Result Value Ref Range    Lactic Acid (POC) 1.2 0.4 - 2.0 mmol/L   EKG, 12 LEAD, INITIAL    Collection Time: 02/10/17 10:47 AM   Result Value Ref Range    Ventricular Rate 89 BPM    Atrial Rate 89 BPM    P-R Interval 116 ms    QRS Duration 106 ms    Q-T Interval 382 ms    QTC Calculation (Bezet) 464 ms    Calculated P Axis 2 degrees    Calculated R Axis -14 degrees    Calculated T Axis 151 degrees    Diagnosis       Sinus rhythm with occasional premature ventricular complexes  Incomplete left bundle branch block  ST & T wave abnormality, consider lateral ischemia  Abnormal ECG  When compared with ECG of 18-JAN-2015 21:39,  premature ventricular complexes are now present  Incomplete left bundle branch block has replaced Left bundle branch block  ST no longer elevated in Anterior leads  T wave inversion no longer evident in Anterior leads     INFLUENZA A & B AG (RAPID TEST)    Collection Time: 02/10/17 10:51 AM   Result Value Ref Range    Influenza A Antigen NEGATIVE  NEG      Influenza B Antigen NEGATIVE  NEG         EKG interpretation by ED Physician:  [id number], rate 89, normal axis, NSR with PVC, LBBB, no ST changes    X-Ray, CT or other radiology findings or impressions:  XR CHEST PORT    (Results Pending)       Progress notes, Consult notes or additional Procedure notes:   Pt with CAP, will treat with abx. RA sats 92%, will admit. No sign of severe sepsis or septic shock  Pt given home HTn meds. 11:46 AM Davina Vega DO discussed care with Dr. Nori Enamorado). Standard discussion; including history of patients chief complaint, available diagnostic results, and treatment course. Reevaluation of patient:   11:48 AM Davina Vega DO discussed results and findings, as well as, diagnosis and plan for admission. Pt and daughter verbalize understanding and agreement with plan. All questions addressed at this time. Disposition:  Diagnosis:   1. CAP (community acquired pneumonia)        Disposition: admitted    [facility]  Documented by: Celia grider for, and in the presence of,Taniya Mcallister DO      Signed by: Elaine Alan, 02/10/17, 10:43 AM      PROVIDER ATTESTATION STATEMENT  I personally performed the services described in the documentation, reviewed the documentation, as recorded by the scribe in my presence, and it accurately and completely records my words and actions.   Davina Vega DO

## 2017-02-10 NOTE — IP AVS SNAPSHOT
Jericho Matthews 
 
 
 [address] [place] Patient: Rajinder Corimer MRN: [id number] You are allergic to the following Allergen Reactions Aspirin Other (comments) Severe nosebleeds Other Medication Hives Any CILLINS (HIVES) Recent Documentation Height Weight Breastfeeding? BMI OB Status Smoking Status 1.651 m 42.9 kg Unknown 15.74 kg/m2 Postmenopausal Former Smoker Unresulted Labs Order Current Status CULTURE, BLOOD Preliminary result CULTURE, BLOOD Preliminary result Emergency Contacts Name Discharge Info Relation Home Work [facility] DISCHARGE CAREGIVER [3] Child [2] [phone] About your hospitalization You were admitted on:  February 10, 2017 You last received care in the:  [address] [place] [address] You were discharged on:  February 15, 2017 Unit phone number:  [phone] Why you were hospitalized Your primary diagnosis was:  Not on File Your diagnoses also included:  Cap (Community Acquired Pneumonia), Chronic Diastolic Heart Failure (Hcc), Hypertension, Fibrotic Lung Diseases (Hcc) Providers Seen During Your Hospitalizations Provider Role Specialty Primary office phone Brooks Fernandez DO Attending Provider Emergency Medicine [phone] Rosa M Lantigua MD Attending Provider [facility] [phone] Your Primary Care Physician (PCP) Primary Care Physician Office Phone Office Fax Tenisha Carolinivett [phone] Follow-up Information Follow up With Details Comments Contact Info Rosa M Lantigua MD On 2017 at 2:45pm [address] [facility] [address] 
[phone] Current Discharge Medication List  
  
START taking these medications Dose & Instructions Dispensing Information Comments Morning Noon Evening Bedtime  
 levoFLOXacin 500 mg tablet Commonly known as:  Fiona Guadalupe Your next dose is: Today, Tomorrow Other:  _________ Dose:  500 mg Take 1 Tab by mouth daily. Quantity:  5 Tab Refills:  0 CONTINUE these medications which have NOT CHANGED Dose & Instructions Dispensing Information Comments Morning Noon Evening Bedtime  
 albuterol-ipratropium 2.5 mg-0.5 mg/3 ml Nebu Commonly known as:  Andrew Embs Your next dose is: Today, Tomorrow Other:  _________ Dose:  3 mL  
3 mL by Nebulization route as needed for Wheezing. Refills:  0  
     
   
   
   
  
 amLODIPine 5 mg tablet Commonly known as:  Kashif Dale Your next dose is: Today, Tomorrow Other:  _________ TAKE ONE TABLET BY MOUTH ONCE DAILY Quantity:  30 Tab Refills:  6  
     
   
   
   
  
 diazePAM 5 mg tablet Commonly known as:  VALIUM Your next dose is: Today, Tomorrow Other:  _________ Dose:  5 mg Take 5 mg by mouth once as needed. Refills:  0  
     
   
   
   
  
 isosorbide dinitrate 20 mg tablet Commonly known as:  ISORDIL Your next dose is: Today, Tomorrow Other:  _________ Dose:  20 mg Take 1 Tab by mouth two (2) times a day. Quantity:  60 Tab Refills:  6 NEURONTIN 300 mg capsule Generic drug:  gabapentin Your next dose is: Today, Tomorrow Other:  _________ Dose:  300 mg Take 300 mg by mouth three (3) times daily. Refills:  0  
     
   
   
   
  
 timolol 0.5 % ophthalmic gel-forming Commonly known as:  TIMOPTIC-XE Your next dose is: Today, Tomorrow Other:  _________ Dose:  1 Drop Administer 1 Drop to both eyes daily. Refills:  0  
     
   
   
   
  
 VICODIN 5-500 mg per tablet Generic drug:  HYDROcodone-acetaminophen Your next dose is: Today, Tomorrow Other:  _________ Take  by mouth as needed for Pain. Refills:  0 Where to Get Your Medications Information on where to get these meds will be given to you by the nurse or doctor. ! Ask your nurse or doctor about these medications  
  levoFLOXacin 500 mg tablet Discharge Instructions DISCHARGE SUMMARY from Nurse The following personal items are in your possession at time of discharge: 
 
Dental Appliances: None Visual Aid: None Home Medications: None Jewelry: None Clothing: None Other Valuables: None Personal Items Sent to Safe: N/A 
 
 
 
 
PATIENT INSTRUCTIONS: 
 
After general anesthesia or intravenous sedation, for 24 hours or while taking prescription Narcotics: · Limit your activities · Do not drive and operate hazardous machinery · Do not make important personal or business decisions · Do  not drink alcoholic beverages · If you have not urinated within 8 hours after discharge, please contact your surgeon on call. Report the following to your surgeon: 
· Excessive pain, swelling, redness or odor of or around the surgical area · Temperature over 100.5 · Nausea and vomiting lasting longer than 4 hours or if unable to take medications · Any signs of decreased circulation or nerve impairment to extremity: change in color, persistent  numbness, tingling, coldness or increase pain · Any questions What to do at Home: 
Recommended activity: Activity as tolerated, use caution for falls. Keep turned and repositioned every 2 hours ans watch for skin breakdown If you experience any of the following symptoms unrelieved pain, shortness of breath, fever, please follow up with doctor or call 911 if an emergency. *  Please give a list of your current medications to your Primary Care Provider.  
 
*  Please update this list whenever your medications are discontinued, doses are 
    changed, or new medications (including over-the-counter products) are added. *  Please carry medication information at all times in case of emergency situations. These are general instructions for a healthy lifestyle: No smoking/ No tobacco products/ Avoid exposure to second hand smoke Surgeon General's Warning:  Quitting smoking now greatly reduces serious risk to your health. Obesity, smoking, and sedentary lifestyle greatly increases your risk for illness A healthy diet, regular physical exercise & weight monitoring are important for maintaining a healthy lifestyle You may be retaining fluid if you have a history of heart failure or if you experience any of the following symptoms:  Weight gain of 3 pounds or more overnight or 5 pounds in a week, increased swelling in our hands or feet or shortness of breath while lying flat in bed. Please call your doctor as soon as you notice any of these symptoms; do not wait until your next office visit. Recognize signs and symptoms of STROKE: 
 
F-face looks uneven A-arms unable to move or move unevenly S-speech slurred or non-existent T-time-call 911 as soon as signs and symptoms begin-DO NOT go Back to bed or wait to see if you get better-TIME IS BRAIN. Warning Signs of HEART ATTACK Call 911 if you have these symptoms: 
? Chest discomfort. Most heart attacks involve discomfort in the center of the chest that lasts more than a few minutes, or that goes away and comes back. It can feel like uncomfortable pressure, squeezing, fullness, or pain. ? Discomfort in other areas of the upper body. Symptoms can include pain or discomfort in one or both arms, the back, neck, jaw, or stomach. ? Shortness of breath with or without chest discomfort. ? Other signs may include breaking out in a cold sweat, nausea, or lightheadedness. Don't wait more than five minutes to call [address]!  Fast action can save your life. Calling 911 is almost always the fastest way to get lifesaving treatment. Emergency Medical Services staff can begin treatment when they arrive  up to an hour sooner than if someone gets to the hospital by car. The discharge information has been reviewed with the caregiver. The caregiver verbalized understanding. Discharge medications reviewed with the caregiver and appropriate educational materials and side effects teaching were provided. [employer] Activation Thank you for requesting access to [employer]. Please follow the instructions below to securely access and download your online medical record. Locish allows you to send messages to your doctor, view your test results, renew your prescriptions, schedule appointments, and more. How Do I Sign Up? 1. In your internet browser, go to [email or web address]. 2. Click on the First Time User? Click Here link in the Sign In box. You will see the New Member Sign Up page. 3. Enter your Locish Access Code exactly as it appears below. You will not need to use this code after youve completed the sign-up process. If you do not sign up before the expiration date, you must request a new code. Locish Access Code: [email or web address] Expires: 2017  2:11 PM (This is the date your Locish access code will ) 4. Enter the last four digits of your Social Security Number (xxxx) and Date of Birth (mm/dd/yyyy) as indicated and click Submit. You will be taken to the next sign-up page. 5. Create a Locish ID. This will be your Locish login ID and cannot be changed, so think of one that is secure and easy to remember. 6. Create a Locish password. You can change your password at any time. 7. Enter your Password Reset Question and Answer. This can be used at a later time if you forget your password. 8. Enter your e-mail address.  You will receive e-mail notification when new information is available in [employer]. 9. Click Sign Up. You can now view and download portions of your medical record. 10. Click the Download Summary menu link to download a portable copy of your medical information. Additional Information If you have questions, please visit the Frequently Asked Questions section of the [employer] website at [email or web address] Remember, [employer] is NOT to be used for urgent needs. For medical emergencies, dial 911. Patient armband removed and shredded Discharge Orders None [employer] Announcement We are excited to announce that we are making your provider's discharge notes available to you in [employer]. You will see these notes when they are completed and signed by the physician that discharged you from your recent hospital stay. If you have any questions or concerns about any information you see in [employer], please call the Health Information Department where you were seen or reach out to your Primary Care Provider for more information about your plan of care. Introducing [facility]! [employer] introduces [employer] patient portal. Now you can access parts of your medical record, email your doctor's office, and request medication refills online. 1. In your internet browser, go to [email or web address] 2. Click on the First Time User? Click Here link in the Sign In box. You will see the New Member Sign Up page. 3. Enter your [employer] Access Code exactly as it appears below. You will not need to use this code after youve completed the sign-up process. If you do not sign up before the expiration date, you must request a new code. · [employer] Access Code: [email or web address] Expires: 4/19/2017  2:11 PM 
 
4. Enter the last four digits of your Social Security Number (xxxx) and Date of Birth (mm/dd/yyyy) as indicated and click Submit. You will be taken to the next sign-up page. 5. Create a Tribzi ID. This will be your Tribzi login ID and cannot be changed, so think of one that is secure and easy to remember. 6. Create a Tribzi password. You can change your password at any time. 7. Enter your Password Reset Question and Answer. This can be used at a later time if you forget your password. 8. Enter your e-mail address. You will receive e-mail notification when new information is available in [address] 9. Click Sign Up. You can now view and download portions of your medical record. 10. Click the Download Summary menu link to download a portable copy of your medical information. If you have questions, please visit the Frequently Asked Questions section of the [employer] website. Remember, [employer] is NOT to be used for urgent needs. For medical emergencies, dial 911. Now available from your iPhone and Android! General Information Please provide this summary of care documentation to your next provider. Patient Signature:  ____________________________________________________________ Date:  ____________________________________________________________  
  
Garrett Welch Provider Signature:  ____________________________________________________________ Date:  ____________________________________________________________

## 2017-02-11 NOTE — ED NOTES
Bedside and Verbal shift change report given to Mark Hernandez (oncoming nurse) by Saima Gama RN (offgoing nurse). Report included the following information SBAR and ED Summary. Pt is AxOx2, NAD, resting comfortably in bed on room air.

## 2017-02-11 NOTE — PROGRESS NOTES
Progress Note    Patient: Vicenta Pearce MRN: [id number]  SSN: [id number]    YOB: 1926  Age: 80 y.o. Sex: female      Admit Date: 2/10/2017    LOS: 1 day     Subjective:     Patient admitted with fever. Having headache at this time. Objective:     Vitals:    02/11/17 0000 02/11/17 0030 02/11/17 0400 02/11/17 0903   BP: 122/44 119/53 146/76 128/63   Pulse: 83 85 86 82   Resp: 21 23 18 18   Temp: 96.7 °F (35.9 °C)  98.2 °F (36.8 °C) 98.4 °F (36.9 °C)   SpO2: 100% 98% 100% 97%   Weight:   41 kg (90 lb 6.2 oz)    Height:   5' 5\" (1.651 m)         Intake and Output:  Current Shift:    Last three shifts:      Physical Exam:   GENERAL: alert, mild distress, appears older than stated age  LUNG: clear to auscultation bilaterally  HEART: regular rate and rhythm, S1, S2 normal, no murmur, click, rub or gallop    Lab/Data Review: All lab results for the last 24 hours reviewed. Blood cultures negative so far  Positive nitrite in urine    Assessment:     Active Problems:    Hypertension ()      Fibrotic lung diseases (HCC) ()      Chronic diastolic heart failure ([address] (1/15/2013)      CAP (community acquired pneumonia) (2/10/2017)        Plan:     Urine culture.     Signed By: Josr Castro MD     February 11, 2017

## 2017-02-11 NOTE — PROGRESS NOTES
Patient is not available to be assessed at this time. No family at bedside.     [address] [facility] Certified [address] [place] [address]   [phone]

## 2017-02-11 NOTE — ED NOTES
Hourly rounding performed. Pt resting comfortably in bed, NAD, AxOx2. Pt denies need for toileting, call bell in reach.

## 2017-02-11 NOTE — ED NOTES
Hourly rounding performed. Pt sleeping comfortably in bed, NAD. Pt denies need for toileting, call bell in reach.

## 2017-02-11 NOTE — ROUTINE PROCESS
Bedside shift change report given to [address], RN (oncoming nurse) by Hue Castro RN (offgoing nurse). Report included the following information SBAR, Kardex and MAR.     1625 - MEWs 3, (d/t hr 112) hr on monitor 70. Heartrate taken via pulse ox, 62. Patient stable no signs of distress. 1908 - Bedside shift change report given to Hue Castro RN (oncoming nurse) by Onofre Green RN (offgoing nurse). Report included the following information SBAR, Kardex and MAR.

## 2017-02-11 NOTE — ROUTINE PROCESS
TRANSFER - OUT REPORT:    Verbal report given to Wu Donis RN on [employer]  being transferred to [employer] (unit) for routine progression of care       Report consisted of patients Situation, Background, Assessment and   Recommendations(SBAR). Information from the following report(s) SBAR, ED Summary, [facility] and Recent Results was reviewed with the receiving nurse. Lines:   Peripheral IV 02/10/17 Right Forearm (Active)   Site Assessment Clean, dry, & intact 2/10/2017 10:30 AM   Phlebitis Assessment 0 2/10/2017 10:30 AM   Infiltration Assessment 0 2/10/2017 10:30 AM   Dressing Status Clean, dry, & intact 2/10/2017 10:30 AM   Dressing Type Transparent 2/10/2017 10:30 AM   Hub Color/Line Status Pink 2/10/2017 10:30 AM       Peripheral IV 02/10/17 Left Forearm (Active)   Site Assessment Clean, dry, & intact 2/10/2017 10:57 AM   Phlebitis Assessment 0 2/10/2017 10:57 AM   Infiltration Assessment 0 2/10/2017 10:57 AM   Dressing Status Clean, dry, & intact 2/10/2017 10:57 AM   Dressing Type Transparent 2/10/2017 10:57 AM   Hub Color/Line Status Blue 2/10/2017 10:57 AM        Opportunity for questions and clarification was provided.       Patient transported with:   Monitor  O2 @ 2 liters  Registered Nurse

## 2017-02-11 NOTE — PROGRESS NOTES
NUTRITION    Nutrition Screen      RECOMMENDATIONS / PLAN:     - Add nutrition supplement: Ensure Pudding TID  - Continue RD inpatient monitoring and evaluation     NUTRITION INTERVENTIONS & DIAGNOSIS:     [x] Meals/Snacks: modified diet  [x] Medical food supplementation: add     Nutrition Diagnosis:  Underweight related to inadequate energy intake as evidenced by BMI of 15 kg/m^2    ASSESSMENT:     Subjective/Objective:  Patient reported having poor appetite and po intake PTA; improving since admission. Viewed breakfast tray this morning; had finished 85% of meal. Per chart; consumed only 5% of lunch today. Patient underweight with BMI of 15 kg/m^2.  Discussed adding nutrition supplement; pt agreed with plan, stated does not tolerate Ensure drinks, but would tolerate Ensure Pudding   Current Appetite:   [] Good     [x] Fair     [] Poor     [] Other:  Appetite/meal intake prior to admission:   [] Good     [] Fair     [x] Poor     [] Other:    Diet: DIET DYSPHAGIA ADVANCED SOFT (NDD3)      Food Allergies:  None known   Feeding Limitations:  [] Swallowing difficulty    [] Chewing difficulty    [] Other:  Current Meal Intake: Patient Vitals for the past 100 hrs:   % Diet Eaten   02/11/17 1353 5 %     Average po intake adequate to meet patients estimated nutritional needs:   [] Yes     [] No   [x] Unable to determine at this time    BM: 2/11   Skin Integrity:  No pressure ulcer or wound  Edema:  None   Pertinent Medications: Reviewed    Recent Labs      02/10/17   1030   NA  138   K  3.7   CL  105   CO2  24   GLU  89   BUN  18   CREA  1.13   CA  8.5   ALB  2.2*   SGOT  21   ALT  14       Intake/Output Summary (Last 24 hours) at 02/11/17 1516  Last data filed at 02/11/17 1353   Gross per 24 hour   Intake              110 ml   Output                0 ml   Net              110 ml       Anthropometrics:  Ht Readings from Last 1 Encounters:   02/11/17 5' 5\" (1.651 m)     Last 3 Recorded Weights in this Encounter    02/10/17 1038 02/11/17 0400   Weight: 39 kg (86 lb) 41 kg (90 lb 6.2 oz)     Body mass index is 15.04 kg/(m^2). Underweight    72% IBW    Weight History:   Patient denied experiencing any recent changes in weight PTA    Weight Metrics 2/11/2017 7/19/2016 1/22/2016 12/22/2015 6/23/2015 5/20/2015 11/15/2014   Weight 90 lb 6.2 oz 97 lb 86 lb 89 lb 99 lb 104 lb 91 lb 14.4 oz   BMI 15.04 kg/m2 16.14 kg/m2 14.31 kg/m2 14.37 kg/m2 15.99 kg/m2 16.79 kg/m2 14.84 kg/m2        Admitting Diagnosis: CAP (community acquired pneumonia)  Past Medical History   Diagnosis Date    Arthritis     Atypical chest pain     CAD (coronary artery disease)     Cardiac echocardiogram 11/13/2014     EF 60%. No RWMA. Mild-mod LVH. Gr 1 DDfx. High ventricular filling pressure. RVSP 45 mmHg. Mod-severe LAE. Mild MR.  Mild-mod TR.  Cardiac nuclear imaging test 12/14/2009     No evidence of ischemia or infarction. Mild prox inferior, inferoseptal hypk. EF 63%.  Carotid duplex 11/13/2014     Mild < 50% bilateral ICA stenosis. Significant LECA tortuousity.  CHF (congestive heart failure) (HCC)     Diabetes mellitus ([facility]     Dilated cardiomyopathy (HCC)      EF now improved to 58% by gated SPECT imaging in December 2005    Fibrotic lung diseases ([email or web address]     Hypertension        Education Needs:        [x] None identified  [] Identified - Not appropriate at this time  []  Identified and addressed - refer to education log  Learning Limitations:   [x] None identified  [] Identified    Cultural, Taoism & ethnic food preferences:  [x] None identified    [] Identified and addressed     ESTIMATED NUTRITION NEEDS:     Calories: 7566-1590 kcal (MSJx1.2-1.3) based on  [] Actual BW:      [] IBW: 57 kg  Protein: 46-57 gm (0.8-1 gm/kg) based on  [] Actual BW:      [] IBW: 57 kg  Fluid: 1 mL/kcal     MONITORING & EVALUATION:     Nutrition Goal(s):   1.  Po intake of meals will meet >75% of patient estimated nutritional needs within the next 5-7 days.   Outcome:  [] Met/Ongoing    []  Not Met    [x] New/Initial Goal     Monitoring:   [x] Monitor meal intake    [] Monitor diet tolerance     [] Monitor supplement intake    Previous Recommendations (for follow-up assessments only):     []   Implemented       []   Not Implemented (RD to address)     [] No Recommendation Made     Discharge Planning:  Regular diet; consistency as tolerates   [x] Participated in care planning, discharge planning, & interdisciplinary rounds as appropriate      Lauren Walsh, [address] [place] [address]   Pager: [phone]

## 2017-02-11 NOTE — ROUTINE PROCESS
Bedside and Verbal shift change report given to YOBANI RN (oncoming nurse) by Ashu Bartholomew RN (offgoing nurse). Report included the following information SBAR, Kardex and MAR.

## 2017-02-11 NOTE — ED NOTES
Hourly rounding performed. Pt resting comfortably in bed, NAD, AxOx2. Pt denies need for toileting, call bell in reach.  Pt placed on 2 L O2 via NC.

## 2017-02-12 NOTE — ROUTINE PROCESS
Bedside and Verbal shift change report given to YOBANI RN (oncoming nurse) by Carlee Hernandez RN (offgoing nurse). Report included the following information SBAR, Kardex and MAR.

## 2017-02-12 NOTE — ROUTINE PROCESS
Bedside shift change report given to [address], RN (oncoming nurse) by Alvaro Laird RN (offgoing nurse). Report included the following information SBAR, Kardex and MAR. Helped feed patient breakfast, likes eggs. 1031 - Medications given. Potential medication side effects explained to patient, patient verbalizes understanding, opportunities for questions provided. Patient stable, No apparent distress at this time, bed in locked position, call bell and phone within reach. .   1898 - Helped feed patient, patient cannot see well. Patient ate all of pudding supplement, a few bites of meat, no veggies, no fruit, no pasta. Observed patient has a hard time chewing, d/t no teeth. 1936 - Bedside shift change report given to [facility], RN (oncoming nurse) by Audi Sanchez RN (offgoing nurse). Report included the following information SBAR, Kardex and MAR.

## 2017-02-12 NOTE — PROGRESS NOTES
Progress Note    Patient: Jhonatan Meyers MRN: [id number]  SSN: [id number]    YOB: 1926  Age: 80 y.o. Sex: female      Admit Date: 2/10/2017    LOS: 2 days     Subjective:     Patient admitted due to fever with concern for pneumonia. Breathing improving. Objective:     Vitals:    02/12/17 0000 02/12/17 0400 02/12/17 0816 02/12/17 1205   BP: 161/72 171/81 151/51 119/63   Pulse: 84 93 (!) 50 61   Resp: 18 18 18 18   Temp: 97 °F (36.1 °C) 98.8 °F (37.1 °C) 98 °F (36.7 °C) 98.2 °F (36.8 °C)   SpO2: 100% 99%  99%   Weight:  44 kg (97 lb)     Height:            Intake and Output:  Current Shift: 02/12 0701 - 02/12 1900  In: 240 [P.O.:240]  Out: -   Last three shifts: 02/10 1901 - 02/12 0700  In: 350 [P.O.:350]  Out: -     Physical Exam:   GENERAL: alert, cooperative, mild distress, appears older than stated age  LUNG: clear to auscultation bilaterally  HEART: regular rate and rhythm, S1, S2 normal, no murmur, click, rub or gallop    Lab/Data Review: All lab results for the last 24 hours reviewed. Urine culture pending  Blood culture negative so far    Assessment:     Active Problems:    Hypertension ()      Fibrotic lung diseases (HCC) ()      Chronic diastolic heart failure ([address] (1/15/2013)      CAP (community acquired pneumonia) (2/10/2017)        Plan:     Continue present management.     Signed By: Jasmina Powers MD     February 12, 2017

## 2017-02-13 NOTE — PROGRESS NOTES
Problem: Dysphagia (Adult)  Goal: *Acute Goals and Plan of Care (Insert Text)  Patient will:  1. Tolerate PO trials with 0 s/s overt distress in 4/5 trials    Rec:   Puree diet with thin liquids  Aspiration precautions  HOB >45 during po intake, remain >30 for 30-45 minutes after po   Small bites/sips; alternate liquid/solid with slow feeding rate   Oral care TID  Meds per pt preference  [address] EVALUATION     Patient: Rachelle Hernández (61 y.o. female)  Date: 2/13/2017  Primary Diagnosis: CAP (community acquired pneumonia)        Precautions: aspiration         ASSESSMENT :  Based on the objective data described below, the patient presents with mild oral dysphagia compromised by edentulous status. Pt tolerated puree and thin liquids +/- straw without any overt s/sx of aspiration. Laryngeal elevation appeared functional and timely to palpation. Pt reports that she does not utilize dentures and would prefer a puree diet. Rec to continue puree diet with thin liquids, meds as tolerated, aspiration precautions, and oral care TID. ST to f/u x 1-2 more visits to ensure safety of PO. Patient will benefit from skilled intervention to address the above impairments. Patients rehabilitation potential is considered to be Good  Factors which may influence rehabilitation potential include:   [X]            None noted       PLAN :  Recommendations and Planned Interventions: See above  Frequency/Duration: Patient will be followed by speech-language pathology x 1-2 more visits to address goals. Discharge Recommendations: [address] and To Be Determined       SUBJECTIVE:   Patient stated I know where I am!! Why does everyone ask me that? .      OBJECTIVE:       Past Medical History   Diagnosis Date    Arthritis      Atypical chest pain      CAD (coronary artery disease)      Cardiac echocardiogram 11/13/2014       EF 60%. No RWMA. Mild-mod LVH. Gr 1 DDfx.   High ventricular filling pressure. RVSP 45 mmHg. Mod-severe LAE. Mild MR.  Mild-mod TR.  Cardiac nuclear imaging test 12/14/2009       No evidence of ischemia or infarction. Mild prox inferior, inferoseptal hypk. EF 63%.  Carotid duplex 11/13/2014       Mild < 50% bilateral ICA stenosis. Significant LECA tortuousity.  CHF (congestive heart failure) (HCC)      Diabetes mellitus ([facility] [address]      Dilated cardiomyopathy (HCC)         EF now improved to 58% by gated SPECT imaging in December 2005    Fibrotic lung diseases ([facility] [address]      Hypertension       Past Surgical History   Procedure Laterality Date    Pr biopsy of breast, incisional           left breast     Prior Level of Function/Home Situation: apartment  Home Situation  Home Environment: Apartment  # Steps to Enter: 0  One/Two Story Residence: One story  Living Alone: No  Support Systems: Family member(s)  Patient Expects to be Discharged to[de-identified] Apartment  Current DME Used/Available at Home: Other (comment)  Diet prior to admission: puree with thin  Current Diet:  Puree with thin   Cognitive and Communication Status:  Neurologic State: Alert  Orientation Level: Oriented to person, Oriented to place  Cognition: Follows commands           Oral Assessment:  Oral Assessment  Labial: No impairment  Dentition: Edentulous  Oral Hygiene: Adequate  Lingual: No impairment  Velum: No impairment  Mandible: No impairment  P.O. Trials:  Patient Position: [facility] at [facility]  Vocal quality prior to P.O.: No impairment  Consistency Presented:  Thin liquid;Puree  How Presented: Self-fed/presented;SLP-fed/presented;Cup/sip;Spoon;Straw     Bolus Acceptance: No impairment  Bolus Formation/Control: No impairment     Propulsion: No impairment  Oral Residue: None  Initiation of Swallow: No impairment  Laryngeal Elevation: Functional  Aspiration Signs/Symptoms: None  Pharyngeal Phase Characteristics: No impairment, issues, or problems   Effective Modifications: None  Cues for Modifications: None        Oral Phase Severity: Mild  Pharyngeal Phase Severity : No impairment     GCODESwallowing:  Swallow Current Status CI= 1-19%   Swallow Goal Status CH= 0%     The severity rating is based on the following outcomes:             Clinical Judgment     Pain:  Pt c/o 0/10 pain prior to evaluation. Pt c/o 0/10 pain post evaluation. After treatment:   [ ]            Patient left in no apparent distress sitting up in chair  [X]            Patient left in no apparent distress in bed  [X]            Call bell left within reach  [X]            Nursing notified  [ ]            Caregiver present  [ ]            Bed alarm activated      COMMUNICATION/EDUCATION:   [X]            SLP educated pt with regard to compensatory swallow strategies and                  aspiration/reflux precautions including: small bites/sips,                  alternate liquids/solids, decrease feeding rate, HOB > 45 with all po, and                   upright in bed at 30 degrees after po for at least 45 minutes. [X]            Patient/family have participated as able in goal setting and plan of care.      Thank you for this referral.     Jeferson Shelley M.S. CCC-SLP/L  Speech-Language Pathologist

## 2017-02-13 NOTE — CDMP QUERY
Please clarify if this patient is being treated/managed for:    => Mild, Moderate or Severe Protein calorie malnutrition as evidenced by BMI 15, w/ poor po intake prior to admission  =>Other Explanation of clinical findings  =>Unable to Determine (no explanation of clinical findings)    The medical record reflects the following clinical findings, treatment, and risk factors:    81yo female w/ fibrotic lung disease presented w/ worsening cough, fever and diagnosed w/ pneumonia. Her height =5'5\",  weight = 97lbs and her BMI = 15.04. Per the ER, she is described as thin and frail. A nutrition consult was obtained and indicates patient is \"Underweight related to inadequate energy intake as evidenced by BMI of 15 kg/m^2. Patient reported having poor appetite and po intake PTA; improving since admission. Viewed breakfast tray this morning; had finished 85% of meal. Per chart; consumed only 5% of lunch today\". They have recommended Ensure pudding TID and monitor po intake. Please clarify and document your clinical opinion in the progress notes and discharge summary including the definitive and/or presumptive diagnosis, (suspected or probable), related to the above clinical findings. Please include clinical findings supporting your diagnosis. If you DECLINE this query or would like to communicate with [facility], please utilize the \"OnLive message box\" at the TOP of the Progress Note on the right.       Thank you,  Trace [facility], [address] [place] [address] Ne

## 2017-02-13 NOTE — ROUTINE PROCESS
Verbal and bedside Shift changed report given to Marin Estrada RN (oncoming RN) on Pt. Condition. Report consisted of patients Situation, History, Activities, intake/output,  Background, Assessment and Recommendations(SBAR). Information from the following report(s) Kardex, order Summary, Lab results and MAR was reviewed with the receiving nurse. Opportunity for questions and clarification was provided.

## 2017-02-13 NOTE — ROUTINE PROCESS
Report received from JEANNETTE Flores RN. Lying in bed asleep. Easily awakened. Denies pain or shortness of breath. See assessment. Call bell in reach.

## 2017-02-13 NOTE — PROGRESS NOTES
Progress Note    Patient: Gordon Gates MRN: [id number]  SSN: [id number]    YOB: 1926  Age: 80 y.o. Sex: female      Admit Date: 2/10/2017    LOS: 3 days     Subjective:     Patient admitted due to initial hypoxia with weakness with concern for pneumonia. Breathing improving    Objective:     Vitals:    02/13/17 0440 02/13/17 0624 02/13/17 0720 02/13/17 1213   BP: 178/61  174/68 108/69   Pulse: 80  82 73   Resp: 18  18 18   Temp: 98.3 °F (36.8 °C)  98.1 °F (36.7 °C) 96.9 °F (36.1 °C)   SpO2: 100%  100% 98%   Weight:  42.3 kg (93 lb 3.2 oz)     Height:            Intake and Output:  Current Shift: 02/13 0701 - 02/13 1900  In: 280 [P.O.:280]  Out: -   Last three shifts: 02/11 1901 - 02/13 0700  In: 1580 [P.O.:480; I.V.:1100]  Out: -     Physical Exam:   GENERAL: alert, cooperative, no distress, appears stated age, appears older than stated age  LUNG: clear to auscultation bilaterally, diminished breath sounds L base  HEART: regular rate and rhythm, S1, S2 normal, no murmur, click, rub or gallop    Lab/Data Review: All lab results for the last 24 hours reviewed. Blood cultures negative    Assessment:     Active Problems:    Hypertension ()      Fibrotic lung diseases (HCC) ()      Chronic diastolic heart failure ([facility] [address] (1/15/2013)      CAP (community acquired pneumonia) (2/10/2017)        Plan:     Change antibiotic to levaquin  OT and PT consults  Consult home health  Possible discharge tomorrow. Signed By: Nataly Ng MD     February 13, 2017        Patient has moderate protein calorie malnutrition.

## 2017-02-13 NOTE — ROUTINE PROCESS
2140 Bedside and Verbal shift change  Received from AZAEL Urbina RN (outgoing nurse), to JEANNETTE Kumar (oncoming)  Pt. Is AOX self. IV patent and infusing well, Pt. denies  pain at this time. Report included the following information SBAR, Kardex, Procedure Summary, Intake/Output, MAR, Recent Lab Results, and  Cardiac Rhythm @ NSR. Will resume care and monitor Pt. Condition. Pt. Educated on call bell when in need of help and assistance. Pt. Laura Cheatham to verbalize understanding. Pt. Head to toe Assessment Done and documented. 2204  Night meds given. 2230  Pt. Cleaned up/complete bath per CNA. 2320  Pt. Resting in bed comfortably, no sign of distress. 0200  Pt. Resting well in bed no sign of discomfort. 0400  Sleeping with eyes closed, easily awaken. 0530  Able to rest well throughout the shift. 0645  Pt. Made no complaints.

## 2017-02-13 NOTE — ROUTINE PROCESS
Assumed patient care. Patient in bed, awake, alert and oriented x3. Denies pain or discomforts at this time. Incontinence care provided by nursing asst. Call light placed within reach. 2141-Bedside shift change report given to Stephanie Simpson (oncoming nurse) by Randy Bryant RN (offgoing nurse). Report given with SBAR, Kardex, Intake/Output, MAR and Recent Results.

## 2017-02-14 NOTE — PROGRESS NOTES
Care Management Interventions  PCP Verified by CM: Yes (THIERYR Simms)  Palliative Care Consult (Criteria: CHF and RRAT>21): No  Reason for No Palliative Care Consult: Other (see comment)  Mode of Transport at Discharge: BLS  MyChart Signup: No  Discharge Durable Medical Equipment: No (patient has O2, wc, bsc, rw at home. patient will need portable O2.)  Health Maintenance Reviewed: Yes  Physical Therapy Consult: Yes  Occupational Therapy Consult: Yes  Speech Therapy Consult: Yes  Current Support Network: Own Home (pt's daughter lives with her; patient has PCA 7 days per wk)  Confirm Follow Up Transport: Cab  Plan discussed with Pt/Family/Caregiver: Yes  Discharge Location  Discharge Placement: Home     Patient 80years old female admitted to [facility] with CAP. Saw the patient and her daughter/SUMAA-Monique Craft in room. Patient AAO x 4, open to conversation, agrees for her daughter to assist in interview process. Patient lives in her own home where her daughter lives with her. Patient has all listed DMEs, however, she will need portable O2 at dc. Patient is wc bound, she has PCA 7 days per week from 1000 am to 1600 by \"family care\". Patient's daughter wants for her mother to return home, she stated the need for transportation at dc. CM will continue to follow and to assist in dcp needs.  Yenifer Gutiérrez rn, cm

## 2017-02-14 NOTE — PROGRESS NOTES
Problem: Mobility Impaired (Adult and Pediatric)  Goal: *Acute Goals and Plan of Care (Insert Text)  STGs to be addressed within 3 days:  1. Bed mobility: Supine to sit to supine Min A with HR for meals. 2. Activity tolerance: Tolerate up in chair 1-2 hrs for ADLs. 3. Transfers: Sit to stand to chair Mod A with LRAD for ADLs. LTGs to be addressed within 7 days:  1. Standing/Ambulation Balance: Increase to Good with LRAD for safe transfers and gait. 2. Ambulation: Ambulate 50 ft. Min A with LRAD for home mobility. 3. Patient Education: Independent with HEP for home safety. Outcome: Progressing Towards Goal  PHYSICAL THERAPY EVALUATION     Patient: Shravan Estevez (72 y.o. female)  Date: 2/14/2017  Primary Diagnosis: CAP (community acquired pneumonia)  Precautions: Fall (Blind)      ASSESSMENT :  Based on the objective data described below, the patient presents to PT/OT co-treat with decreased functional mobility including bed mobility, transfers, ambulation, and general activity tolerance following admission for community-acquired pneumonia. PTA, patient reports ambulating within home with [facility], able to dress/bathe herself, daughter assists with home maintenance and meal prep. Unsure of accuracy of PLOF details as patient required increased level of assistance during mobility tasks with decreased compliance with commands. Today, patient presents sitting in recliner with feet elevated, pillow under B knees, report of productive cough despite sputum production while PT in room. Patient appears frail and is legally blind, states she is unable to see anything at all. Patient demonstrated impaired command following during sit <> stand transfers. Transfer attempted 3X to RW with Total A, patient demonstrates increased trunk extension and would not place weight into B legs despite max verbal and tactile cues. Patient left sitting in recliner with feet elevated, call bell within reach, and OT in room.  Patient will benefit from skilled intervention to address the above impairments and facilitate D/C planning. Patients rehabilitation potential is considered to be Fair  Factors which may influence rehabilitation potential include:   [ ]         None noted  [X]         Mental ability/status  [X]         Medical condition  [ ]         Home/family situation and support systems  [X]         Safety awareness  [ ]         Pain tolerance/management  [ ]         Other:        PLAN :  Recommendations and Planned Interventions:  [X]           Bed Mobility Training             [X]    Neuromuscular Re-Education  [X]           Transfer Training                   [ ]    Orthotic/Prosthetic Training  [X]           Gait Training                          [ ]    Modalities  [X]           Therapeutic Exercises          [ ]    Edema Management/Control  [X]           Therapeutic Activities            [X]    Patient and Family Training/Education  [ ]           Other (comment):     Frequency/Duration: Patient will be followed by physical therapy daily to address goals. Discharge Recommendations: Skilled Nursing Facility  Further Equipment Recommendations for Discharge: bedside commode       SUBJECTIVE:   Patient stated I'm waiting for someone to call my daughter.       OBJECTIVE DATA SUMMARY:       Past Medical History   Diagnosis Date    Arthritis      Atypical chest pain      CAD (coronary artery disease)      Cardiac echocardiogram 11/13/2014       EF 60%. No RWMA. Mild-mod LVH. Gr 1 DDfx. High ventricular filling pressure. RVSP 45 mmHg. Mod-severe LAE. Mild MR.  Mild-mod TR.  Cardiac nuclear imaging test 12/14/2009       No evidence of ischemia or infarction. Mild prox inferior, inferoseptal hypk. EF 63%.  Carotid duplex 11/13/2014       Mild < 50% bilateral ICA stenosis. Significant LECA tortuousity.       CHF (congestive heart failure) ([facility] [address]      Diabetes mellitus ([facility] [address]      Dilated cardiomyopathy ([facility] [address]         EF now improved to 58% by gated SPECT imaging in December 2005    Fibrotic lung diseases ([facility] [address]      Hypertension       Past Surgical History   Procedure Laterality Date    Pr biopsy of breast, incisional           left breast     Barriers to Learning/Limitations: yes;  sensory deficits-vision/hearing/speech  Compensate with: Verbal Cues and Tactile Cues  Prior Level of Function/Home Situation:  Home Situation  Home Environment: Private residence  # Steps to Enter: 3  Rails to Enter: No  One/Two Story Residence: Two story, live on 1st floor  Living Alone: No  Support Systems: Family member(s), Child(cindy)  Patient Expects to be Discharged to[de-identified] Private residence  Current DME Used/Available at Home: Walker, rolling, Wheelchair, Shower chair, Cane, straight  Critical Behavior:  Neurologic State: Alert  Psychosocial  Patient Behaviors: Calm; Cooperative  Purposeful Interaction: Yes  Pt Identified Daily Priority: Clinical issues (comment)  Caritas Process: Nurture loving kindness;Establish trust;Nurture spiritual self;Teaching/learning; Attend basic human needs  Caring Interventions: Reassure; Therapeutic modalities  Reassure: Therapeutic listening; Informing; Acceptance; Instilling derek and hope;Support family;Caring rounds  Therapeutic Modalities: Deep breathing;Humor; Intentional therapeutic touch  Strength:    Strength: Grossly decreased, non-functional (B LE)  Tone & Sensation:   Sensation: Intact (B LE to LT)   Range Of Motion:  AROM: Within functional limits (B LE)  Functional Mobility:  Transfers:  Sit to Stand: Total assistance  Stand to Sit: Total assistance  Balance:   Sitting: Impaired  Sitting - Static: Supported sitting  Standing: Impaired  Standing - Static:  (N/T)  Ambulation/Gait Training:   N/T as patient unable to come to standing  Pain:  Pain Scale 1: Numeric (0 - 10)  Pain Intensity 1: 0  Activity Tolerance:   Fair/Poor  Please refer to the flowsheet for vital signs taken during this treatment.   After treatment:   [X] Patient left in no apparent distress sitting up in chair  [ ] Patient left sitting on EOB  [ ] Patient left in no apparent distress in bed  [ ] Patient declined to be OOB at this time due to  [X] Call bell left within reach  [ ] Nursing notified  [X] Caregiver present  [ ] Bed alarm activated  COMMUNICATION/EDUCATION:   [X]         Fall prevention education was provided and the patient/caregiver indicated understanding. [X]         Patient/family have participated as able in goal setting and plan of care. [X]         Patient/family agree to work toward stated goals and plan of care. [ ]         Patient understands intent and goals of therapy, but is neutral about his/her participation. [ ]         Patient is unable to participate in goal setting and plan of care. Thank you for this referral.  Medina Walsh   Time Calculation: 16 mins     G-codes: Mobility [id number] Current  CN= 100%   Goal  CL= 60-79%. The severity rating is based on the Level of Assistance required for Functional Mobility and ADLs.      Eval Complexity: History: MEDIUM  Complexity : 1-2 comorbidities / personal factors will impact the outcome/ POC Exam:MEDIUM Complexity : 3 Standardized tests and measures addressing body structure, function, activity limitation and / or participation in recreation  Presentation: MEDIUM Complexity : Evolving with changing characteristics  Overall Complexity:MEDIUM

## 2017-02-14 NOTE — PROGRESS NOTES
Problem: Self Care Deficits Care Plan (Adult)  Goal: *Acute Goals and Plan of Care (Insert Text)  Occupational Therapy Goals  Initiated 2/14/2017 within 7 day(s). 1. Patient will perform self-feeding with minimal assistance/contact guard assist   2. Patient will perform grooming with minimal assistance/contact guard assist.  3. Patient will perform upper body dressing with minimal assistance/contact guard assist.  4. Patient will participate in upper extremity therapeutic exercise/activities with minimal assistance/contact guard assist in preperation for self care tasks. Outcome: Progressing Towards Goal  OCCUPATIONAL THERAPY EVALUATION     Patient: Elli Granados (80 y.o. female)  Date: 2/14/2017  Primary Diagnosis: CAP (community acquired pneumonia)        Precautions:   Fall, Skin, Seizure      ASSESSMENT : Co-eval with PT for patient safety  Based on the objective data described below, the patient in chair upon arrival. Patient is legally blind. Patient reported she was independent with ADLs and mobility PTA. Patient needed total assistance x2 to attempt to stand; patient was not able to fully stand. Patient is oriented x2. Patients' call bell adapted with raised button; patient educated on use and was able to press call light with min VC's, but progressed to supervision with set-up. Patients' nurse, Bambi, notified. Patient will be placed on a 3-day trial for OT services at this time. Patient will benefit from skilled intervention to address the above impairments.   Patients rehabilitation potential is considered to be Good  Factors which may influence rehabilitation potential include:   [ ]             None noted  [ ]             Mental ability/status  [X]             Medical condition  [ ]             Home/family situation and support systems  [ ]             Safety awareness  [ ]             Pain tolerance/management  [ ]             Other:        PLAN :  Recommendations and Planned Interventions:  [X]               Self Care Training                  [X]        Therapeutic Activities  [X]               Functional Mobility Training    [ ]        Cognitive Retraining  [X]               Therapeutic Exercises           [X]        Endurance Activities  [X]               Balance Training                   [ ]        Neuromuscular Re-Education  [ ]               Visual/Perceptual Training     [X]   Home Safety Training  [X]               Patient Education                 [ ]        Family Training/Education  [ ]               Other (comment):     Frequency/Duration: Patient will be followed by occupational therapy 1-2 times per day/4-7 days per week to address goals. Discharge Recommendations: Mohamud Cisneros  Further Equipment Recommendations for Discharge: TBD       PATIENT COMPLEXITY      Eval Complexity: History: MEDIUM Complexity : Expanded review of history including physical, cognitive and psychosocial  history ; Examination: HIGH Complexity : 5 or more performance deficits relating to physical, cognitive , or psychosocial skils that result in activity limitations and / or participation restrictions; Decision Making:HIGH Complexity : Patient presents with comorbidities that affect occupational performance. Signifigant modification of tasks or assistance (eg, physical or verbal) with assessment (s) is necessary to enable patient to complete evaluation  Assessment: Moderate Complexity       G-CODES:      Self Care  Current  CM= 80-99%   Goal  CL= 60-79%. The severity rating is based on the Level of Assistance required for Functional Mobility and ADLs. SUBJECTIVE:   Patient stated I feed myself.       OBJECTIVE DATA SUMMARY:       Past Medical History   Diagnosis Date    Arthritis      Atypical chest pain      CAD (coronary artery disease)      Cardiac echocardiogram 11/13/2014       EF 60%. No RWMA. Mild-mod LVH. Gr 1 DDfx. High ventricular filling pressure. RVSP 45 mmHg. Mod-severe LAE. Mild MR.  Mild-mod TR.  Cardiac nuclear imaging test 12/14/2009       No evidence of ischemia or infarction. Mild prox inferior, inferoseptal hypk. EF 63%.  Carotid duplex 11/13/2014       Mild < 50% bilateral ICA stenosis. Significant LECA tortuousity.  CHF (congestive heart failure) (HCC)      Diabetes mellitus ([facility]      Dilated cardiomyopathy (HCC)         EF now improved to 58% by gated SPECT imaging in December 2005    Fibrotic lung diseases ([facility]      Hypertension       Past Surgical History   Procedure Laterality Date    Pr biopsy of breast, incisional           left breast     Prior Level of Function/Home Situation: Patient reported she was modified independent in basic self care tasks and functional mobility PTA, but unsure of accuracy of patient report; no family present. Home Situation  Home Environment: Private residence  # Steps to Enter: 3  Rails to Enter: No  One/Two Story Residence: Two story, live on 1st floor  Living Alone: No  Support Systems: Child(cindy)  Patient Expects to be Discharged to[de-identified] Private residence  Current DME Used/Available at Home: Walker, rolling, Wheelchair, [address] [employer] Mario chair, Sacha Leanne, straight  [X]  Right hand dominant          [ ]  Left hand dominant  Cognitive/Behavioral Status:  Neurologic State: Alert  Orientation Level: Oriented to person;Oriented to place  Cognition: Follows commands  Safety/Judgement: Decreased insight into deficits; Decreased awareness of need for assistance;Decreased awareness of need for safety;Home safety      Skin: No skin changes noted     Edema: No edema noted      Vision/Perceptual:       Acuity: Impaired far vision; Impaired near vision (patient is legally blind in bilateral eyes)       Coordination:  Coordination: Grossly decreased, non-functional (BUEs)     Balance:  Sitting: Impaired  Sitting - Static: Supported sitting  Standing: Impaired; With support  Standing - Static: Poor; Not tested (not able to fully stand)      Strength:  Strength: Generally decreased, functional ( strength)      Tone & Sensation:  Tone: Normal (BUEs)  Sensation: Intact (BUEs)      Range of Motion:  AROM: Generally decreased, functional (BUEs)      Functional Mobility and Transfers for ADLs:  Bed Mobility:    Patient in chair upon arrival     Transfers:  Sit to Stand: Total assistance;Assist x2 (not able to fully stand)                 ADL Assessment:(clicnial judgment)  Feeding: Total assistance     Oral Facial Hygiene/Grooming: Total assistance     Bathing: Total assistance     Upper Body Dressing: Total assistance     Lower Body Dressing: Total assistance     Toileting: Total assistance      Pain:  Pain Scale 1: Numeric (0 - 10)  Pain Intensity 1: 0      Activity Tolerance:   Poor     Please refer to the flowsheet for vital signs taken during this treatment. After treatment:   [X] Patient left in no apparent distress sitting up in chair  [ ] Patient left in no apparent distress in bed  [X] Call bell left within reach  [X] Nursing notified  [ ] Caregiver present  [ ] Bed alarm activated      COMMUNICATION/EDUCATION:   [ ] Home safety education was provided and the patient/caregiver indicated understanding. [ ] Patient/family have participated as able in goal setting and plan of care. [ ] Patient/family agree to work toward stated goals and plan of care. [X] Patient understands intent and goals of therapy, but is neutral about his/her participation. [ ] Patient is unable to participate in goal setting and plan of care.      Thank you for this referral.  Tory Che, OTR/L  Time Calculation: 25 mins

## 2017-02-14 NOTE — PROGRESS NOTES
Patient has [address] order in place. CM saw the patient and her daughter-Monique in room. They both agree with [address] and chose [facility] agency for services. Pt's daughter signed [address] form, original placed to blue paper chart. Patient and her daughter also were informed about PT recommendations for SNF. Mrs. Edie Mesa says her mother wants to come back home. Mrs. Kimmie Siddiqui also stated she wishes to return home at [place].    CM called, spoke Alycia Bosworth, made referral.

## 2017-02-14 NOTE — PROGRESS NOTES
Progress Note    Patient: Jose Guadalupe Nunn MRN: [id number]  SSN: [id number]    YOB: 1926  Age: 80 y.o. Sex: female      Admit Date: 2/10/2017    LOS: 4 days     Subjective:     Patient with blindness admitted with pneumonia and hypotension. Blood pressure more stable and breathing has improved. Objective:     Vitals:    02/14/17 0203 02/14/17 0455 02/14/17 0828 02/14/17 1231   BP: 156/74 162/71 159/76 133/65   Pulse: 65 72 82 77   Resp: 16 16 24 24   Temp: 97 °F (36.1 °C) 97.8 °F (36.6 °C) 98 °F (36.7 °C) 97.3 °F (36.3 °C)   SpO2: 100% 100% 100% 100%   Weight:  45.4 kg (100 lb)     Height:            Intake and Output:  Current Shift: 02/14 0701 - 02/14 1900  In: 120 [P.O.:120]  Out: -   Last three shifts: 02/12 1901 - 02/14 0700  In: 2600 [P.O.:500; I.V.:2100]  Out: -     Physical Exam:   GENERAL: alert, cooperative, no distress, appears stated age, appears older than stated age  LUNG: diminished breath sounds R base, L base  HEART: regular rate and rhythm, S1, S2 normal, no murmur, click, rub or gallop    Lab/Data Review: All lab results for the last 24 hours reviewed.      Blood cultures negtaive    Assessment:     Active Problems:    Hypertension ()      Fibrotic lung diseases (HCC) ()      Chronic diastolic heart failure ([address] (1/15/2013)      CAP (community acquired pneumonia) (2/10/2017)        Plan:     oob in chair  Arrange for portable oxygen   Probable discharge tomorrow     Signed By: Lorenzo Cobos MD     February 14, 2017

## 2017-02-14 NOTE — ROUTINE PROCESS
Report received from OLIVER Singh RN. Lying in bed resting. Denies pain or shortness of breathSee assessment. Call bell in reach.

## 2017-02-14 NOTE — PROGRESS NOTES
1933 - Received bedside report from Bambi BONILLA RN, patient was resting in bed, oriented to call button. 1196 - Gave report to Bambi BONILLA RN, using SBAR, MAR, and Kardex.

## 2017-02-14 NOTE — ROUTINE PROCESS
Bedside and Verbal shift change report given to OLIVER Emerson RN (oncoming nurse) by Pepe Farmer RN (offgoing nurse). Report included the following information SBAR, Kardex, MAR and Recent Results. SITUATION:    Code Status: DNR   Reason for Admission: CAP (community acquired pneumonia)    [facility] day: 3   Problem List:       Hospital Problems  Date Reviewed: 2/10/2017          Codes Class Noted POA    CAP (community acquired pneumonia) ICD-10-CM: J18.9  ICD-9-CM: 486  2/10/2017 Unknown        Chronic diastolic heart failure ([facility] ICD-10-CM: I50.32  ICD-9-CM: 428.32  1/15/2013 Yes        Hypertension ICD-10-CM: I10  ICD-9-CM: 401.9  Unknown Yes        Fibrotic lung diseases ([facility] ICD-10-CM: J84.10  ICD-9-CM: 5  Unknown Yes              BACKGROUND:    Past Medical History:   Past Medical History   Diagnosis Date    Arthritis     Atypical chest pain     CAD (coronary artery disease)     Cardiac echocardiogram 11/13/2014     EF 60%. No RWMA. Mild-mod LVH. Gr 1 DDfx. High ventricular filling pressure. RVSP 45 mmHg. Mod-severe LAE. Mild MR.  Mild-mod TR.  Cardiac nuclear imaging test 12/14/2009     No evidence of ischemia or infarction. Mild prox inferior, inferoseptal hypk. EF 63%.  Carotid duplex 11/13/2014     Mild < 50% bilateral ICA stenosis. Significant LECA tortuousity.       CHF (congestive heart failure) (HCC)     Diabetes mellitus ([facility]     Dilated cardiomyopathy (HCC)      EF now improved to 58% by gated SPECT imaging in December 2005    [facility]     Hypertension          Patient taking anticoagulants no     ASSESSMENT:    Changes in Assessment Throughout Shift: none     Patient has Central Line: no Reasons if yes:    Patient has Carrasquillo Cath: no Reasons if yes:       Last Vitals:     Vitals:    02/13/17 0624 02/13/17 0720 02/13/17 1213 02/13/17 1608   BP:  174/68 108/69 148/79   Pulse:  82 73 76   Resp:  18 18 18   Temp:  98.1 °F (36.7 °C) 96.9 °F (36.1 °C) 97.5 °F (36.4 °C)   SpO2:  100% 98% 98%   Weight: 42.3 kg (93 lb 3.2 oz)      Height:            IV and DRAINS (will only show if present)   Peripheral IV 02/10/17 Right Forearm-Site Assessment: Clean, dry, & intact  Peripheral IV 02/10/17 Left Forearm-Site Assessment: Clean, dry, & intact     WOUND (if present)   Wound Type:  none   Dressing present Dressing Present : No   Wound Concerns/Notes:  none     PAIN    Pain Assessment    Pain Intensity 1: 0 (02/13/17 1804)    Pain Location 1: Head, Neck    Pain Intervention(s) 1: Medication (see MAR)    Patient Stated Pain Goal: 0  o Interventions for Pain:Tylenol  o Intervention effective: yes  o Time of last intervention: 1704    o Reassessment Completed: yes      Last 3 Weights:  Last 3 Recorded Weights in this Encounter    02/11/17 0400 02/12/17 0400 02/13/17 0624   Weight: 41 kg (90 lb 6.2 oz) 44 kg (97 lb) 42.3 kg (93 lb 3.2 oz)     Weight change: -1.725 kg (-3 lb 12.8 oz)     INTAKE/OUPUT    Current Shift:      Last three shifts: 02/12 0701 - 02/13 1900  In: 2690 [P.O.:640; I.V.:2050]  Out: -      LAB RESULTS   No results for input(s): WBC, HGB, HCT, PLT, HGBEXT, HCTEXT, PLTEXT in the last 72 hours. No results for input(s): NA, K, GLU, BUN, CREA, CA, MG, INR in the last 72 hours. No lab exists for component: PT, PTT, INREXT    RECOMMENDATIONS AND DISCHARGE PLANNING     1. Pending tests/procedures/ Plan of Care or Other Needs: none     2. Discharge plan for patient and Needs/Barriers: home with home health    3. Estimated Discharge Date: 2/16 Posted on Whiteboard in [address] [place] [address] Room: yes      4. The patient's care plan was reviewed with the oncoming nurse.        \"HEALS\" SAFETY CHECK      Fall Risk    Total Score: 4    Safety Measures: Safety Measures: Bed/Chair-Wheels locked, Bed in low position, Call light within reach, Fall prevention (comment), Side rails X 3    A safety check occurred in the patient's room between off going nurse and oncoming nurse listed above. The safety check included the below items  Area Items   H  High Alert Medications - Verify all high alert medication drips (heparin, PCA, etc.)   E  Equipment - Suction is set up for ALL patients (with alyce)  - Red plugs utilized for all equipment (IV pumps, etc.)  - WOWs wiped down at end of shift.  - Room stocked with oxygen, suction, and other unit-specific supplies   A  Alarms - Bed alarm is set for fall risk patients  - Ensure chair alarm is in place and activated if patient is up in a chair   L  Lines - Check IV for any infiltration  - Carrasquillo bag is empty if patient has a Carrasquillo   - Tubing and IV bags are labeled   S  Safety   - Room is clean, patient is clean, and equipment is clean. - Hallways are clear from equipment besides carts. - Fall bracelet on for fall risk patients  - Ensure room is clear and free of clutter  - Suction is set up for ALL patients (with alyce)  - Hallways are clear from equipment besides carts.    - Isolation precautions followed, supplies available outside room, sign posted     Deshawn Cohen RN

## 2017-02-15 NOTE — DISCHARGE SUMMARY
Discharge Summary     Patient: Michael Laguna MRN: [id number]  SSN: [id number]    YOB: 1926  Age: 80 y.o. Sex: female       Admit Date: 2/10/2017    Discharge Date: 2/15/2017      Admission Diagnoses: CAP (community acquired pneumonia)    Discharge Diagnoses:   Problem List as of 2/15/2017  Date Reviewed: 2/10/2017          Codes Class Noted - Resolved    CHF exacerbation ([facility] ICD-10-CM: I50.9  ICD-9-CM: 428.0  12/9/2012 - Present        CAP (community acquired pneumonia) ICD-10-CM: J18.9  ICD-9-CM: 486  2/10/2017 - Present        Complete heart block ([facility] ICD-10-CM: I44.2  ICD-9-CM: 426.0  5/20/2015 - Present        Syncope ICD-10-CM: R55  ICD-9-CM: 780.2  11/12/2014 - Present        SOB (shortness of breath) ICD-10-CM: R06.02  ICD-9-CM: 786.05  7/17/2013 - Present        Vaginal bleeding ICD-10-CM: N93.9  ICD-9-CM: 623.8  4/26/2013 - Present        CHF (congestive heart failure) ([facility] ICD-10-CM: I50.9  ICD-9-CM: 428.0  3/6/2013 - Present        Chronic diastolic heart failure ([facility] ICD-10-CM: I50.32  ICD-9-CM: 428.32  1/15/2013 - Present        Pulmonary hypertension,moderate ICD-10-CM: I27.2  ICD-9-CM: 416.8  1/15/2013 - Present        Dilated cardiomyopathy/EF now improved to 58% by gated SPECT imaging in December 2005. ICD-10-CM: I42.0  ICD-9-CM: 425.4  Unknown - Present    Overview Signed 1/24/2011  9:46 AM by Franki Omalley     EF now improved to 58% by gated SPECT imaging in December 2005             Hypertension ICD-10-CM: I10  ICD-9-CM: 401.9  Unknown - Present        Fibrotic lung diseases ([facility] ICD-10-CM: J84.10  ICD-9-CM: 749  Unknown - Present        Atypical chest pain ICD-10-CM: R07.89  ICD-9-CM: 786.59  Unknown - Present               Discharge Condition: Stable    Hospital Course: Patient with COPD admitted due to increased SOB with fever. Patient has moderate protein calorie malnutrition and is blind.  Patient treated with IV antibiotics and nebulizer treatments with good effect. Patient breathing improved and has home oxygen. Patient wishes to go home with family and home health. Consults: None    Significant Diagnostic Studies: microbiology: blood culture: negative and radiology: CXR: cardiomegaly    Disposition: home    Discharge Medications:   Current Discharge Medication List      START taking these medications    Details   levoFLOXacin (LEVAQUIN) 500 mg tablet Take 1 Tab by mouth daily. Qty: 5 Tab, Refills: 0         CONTINUE these medications which have NOT CHANGED    Details   amLODIPine (NORVASC) 5 mg tablet TAKE ONE TABLET BY MOUTH ONCE DAILY  Qty: 30 Tab, Refills: 6      isosorbide dinitrate (ISORDIL) 20 mg tablet Take 1 Tab by mouth two (2) times a day. Qty: 60 Tab, Refills: 6    Associated Diagnoses: Chronic diastolic heart failure ([address] Dilated cardiomyopathy (HCC)      HYDROcodone-acetaminophen (VICODIN) 5-500 mg per tablet Take  by mouth as needed for Pain.      gabapentin (NEURONTIN) 300 mg capsule Take 300 mg by mouth three (3) times daily. timolol (TIMOPTIC-XR) 0.5 % ophthalmic gel-forming Administer 1 Drop to both eyes daily. diazepam (VALIUM) 5 mg tablet Take 5 mg by mouth once as needed. albuterol-ipratropium (DUO-NEB) 2.5 mg-0.5 mg/3 ml nebulizer solution 3 mL by Nebulization route as needed for Wheezing. Activity: Activity as tolerated  Diet: Regular Diet  Wound Care: None needed    Follow-up Appointments   Procedures    FOLLOW UP VISIT Appointment in: One Week     Standing Status:   Standing     Number of Occurrences:   1     Order Specific Question:   Appointment in     Answer:    One Week       Signed By: Josr Castro MD     February 15, 2017

## 2017-02-15 NOTE — HOME CARE
Received HH referral, Discharge noted for today, [facility] will follow for PT/OT, pt's daughter Hira Yip) ,states her mom already has home O2 with [employer],RW,BSC,transport W/C and nebulizer machine, daughter states that she needs some portable O2, called Bing Anne at [employer] about pt needing more O2 cylinders,Annabelle states that [employer] will contact pt's daughter to set up time to deliver cylinders and supplies for oxygen. KIRT PATHAK.

## 2017-02-15 NOTE — ROUTINE PROCESS
Bedside and Verbal shift change report given to OLIVER Daly RN (oncoming nurse) by Daniel Ambriz RN (offgoing nurse). Report included the following information SBAR, Kardex, MAR and Recent Results. SITUATION:    Code Status: DNR   Reason for Admission: CAP (community acquired pneumonia)    [facility] day: 4   Problem List:       Hospital Problems  Date Reviewed: 2/10/2017          Codes Class Noted POA    CAP (community acquired pneumonia) ICD-10-CM: J18.9  ICD-9-CM: 486  2/10/2017 Unknown        Chronic diastolic heart failure ([facility] [address] ICD-10-CM: I50.32  ICD-9-CM: 428.32  1/15/2013 Yes        Hypertension ICD-10-CM: I10  ICD-9-CM: 401.9  Unknown Yes        Fibrotic lung diseases ([facility] [address] ICD-10-CM: J84.10  ICD-9-CM: 5  Unknown Yes              BACKGROUND:    Past Medical History:   Past Medical History   Diagnosis Date    Arthritis     Atypical chest pain     CAD (coronary artery disease)     Cardiac echocardiogram 11/13/2014     EF 60%. No RWMA. Mild-mod LVH. Gr 1 DDfx. High ventricular filling pressure. RVSP 45 mmHg. Mod-severe LAE. Mild MR.  Mild-mod TR.  Cardiac nuclear imaging test 12/14/2009     No evidence of ischemia or infarction. Mild prox inferior, inferoseptal hypk. EF 63%.  Carotid duplex 11/13/2014     Mild < 50% bilateral ICA stenosis. Significant LECA tortuousity.       CHF (congestive heart failure) (HCC)     Diabetes mellitus ([facility] [address] [facility]     Dilated cardiomyopathy (HCC)      EF now improved to 58% by gated SPECT imaging in December 2005    Fibrotic lung diseases ([facility] [address]     Hypertension          Patient taking anticoagulants no     ASSESSMENT:    Changes in Assessment Throughout Shift: none     Patient has Central Line: no Reasons if yes:    Patient has Carrasquillo Cath: no Reasons if yes:       Last Vitals:     Vitals:    02/14/17 0455 02/14/17 0828 02/14/17 1231 02/14/17 1552   BP: 162/71 159/76 133/65 132/63   Pulse: 72 82 77 71   Resp: 16 24 24 18   Temp: 97.8 °F (36.6 °C) 98 °F (36.7 °C) 97.3 °F (36.3 °C) 98 °F (36.7 °C)   SpO2: 100% 100% 100% 92%   Weight: 45.4 kg (100 lb)      Height:            IV and DRAINS (will only show if present)   Peripheral IV 02/10/17 Right Forearm-Site Assessment: Clean, dry, & intact  Peripheral IV 02/10/17 Left Forearm-Site Assessment: Clean, dry, & intact     WOUND (if present)   Wound Type:  none   Dressing present Dressing Present : No   Wound Concerns/Notes:  none     PAIN    Pain Assessment    Pain Intensity 1: 0 (02/14/17 1817)    Pain Location 1: Head, Neck    Pain Intervention(s) 1: Medication (see MAR)    Patient Stated Pain Goal: 0  o Interventions for Pain:  tyelenol  o Intervention effective: [id number]  o Time of last intervention: 1150   o Reassessment Completed: yes      Last 3 Weights:  Last 3 Recorded Weights in this Encounter    02/12/17 0400 02/13/17 0624 02/14/17 0455   Weight: 44 kg (97 lb) 42.3 kg (93 lb 3.2 oz) 45.4 kg (100 lb)     Weight change: 3.084 kg (6 lb 12.8 oz)     INTAKE/OUPUT    Current Shift:      Last three shifts: 02/13 0701 - 02/14 1900  In: 2316 [P.O.:740; I.V.:1550]  Out: -      LAB RESULTS   No results for input(s): WBC, HGB, HCT, PLT, HGBEXT, HCTEXT, PLTEXT in the last 72 hours. No results for input(s): NA, K, GLU, BUN, CREA, CA, MG, INR in the last 72 hours. No lab exists for component: PT, PTT, INREXT    RECOMMENDATIONS AND DISCHARGE PLANNING     1. Pending tests/procedures/ Plan of Care or Other Needs: none     2. Discharge plan for patient and Needs/Barriers: Home with home health    3. Estimated Discharge Date: 2/15 Posted on Whiteboard in [address] [place] [address] Room: yes      4. The patient's care plan was reviewed with the oncoming nurse.        \"HEALS\" SAFETY CHECK      Fall Risk    Total Score: 4    Safety Measures: Safety Measures: Bed/Chair-Wheels locked, Bed in low position, Call light within reach, Fall prevention (comment), Side rails X 3    A safety check occurred in the patient's room between off going nurse and oncoming nurse listed above. The safety check included the below items  Area Items   H  High Alert Medications - Verify all high alert medication drips (heparin, PCA, etc.)   E  Equipment - Suction is set up for ALL patients (with alyce)  - Red plugs utilized for all equipment (IV pumps, etc.)  - WOWs wiped down at end of shift.  - Room stocked with oxygen, suction, and other unit-specific supplies   A  Alarms - Bed alarm is set for fall risk patients  - Ensure chair alarm is in place and activated if patient is up in a chair   L  Lines - Check IV for any infiltration  - Carrasquillo bag is empty if patient has a Carrasquillo   - Tubing and IV bags are labeled   S  Safety   - Room is clean, patient is clean, and equipment is clean. - Hallways are clear from equipment besides carts. - Fall bracelet on for fall risk patients  - Ensure room is clear and free of clutter  - Suction is set up for ALL patients (with alyce)  - Hallways are clear from equipment besides carts.    - Isolation precautions followed, supplies available outside room, sign posted     Dale Silveira RN

## 2017-02-15 NOTE — DISCHARGE INSTRUCTIONS
DISCHARGE SUMMARY from Nurse    The following personal items are in your possession at time of discharge:    Dental Appliances: None  Visual Aid: None     Home Medications: None  Jewelry: None  Clothing: None  Other Valuables: None  Personal Items Sent to Safe: N/A          PATIENT INSTRUCTIONS:    After general anesthesia or intravenous sedation, for 24 hours or while taking prescription Narcotics:  · Limit your activities  · Do not drive and operate hazardous machinery  · Do not make important personal or business decisions  · Do  not drink alcoholic beverages  · If you have not urinated within 8 hours after discharge, please contact your surgeon on call. Report the following to your surgeon:  · Excessive pain, swelling, redness or odor of or around the surgical area  · Temperature over 100.5  · Nausea and vomiting lasting longer than 4 hours or if unable to take medications  · Any signs of decreased circulation or nerve impairment to extremity: change in color, persistent  numbness, tingling, coldness or increase pain  · Any questions        What to do at Home:  Recommended activity: Activity as tolerated, use caution for falls. Keep turned and repositioned every 2 hours ans watch for skin breakdown    If you experience any of the following symptoms unrelieved pain, shortness of breath, fever, please follow up with doctor or call 911 if an emergency. *  Please give a list of your current medications to your Primary Care Provider. *  Please update this list whenever your medications are discontinued, doses are      changed, or new medications (including over-the-counter products) are added. *  Please carry medication information at all times in case of emergency situations.           These are general instructions for a healthy lifestyle:    No smoking/ No tobacco products/ Avoid exposure to second hand smoke    Surgeon General's Warning:  Quitting smoking now greatly reduces serious risk to your health. Obesity, smoking, and sedentary lifestyle greatly increases your risk for illness    A healthy diet, regular physical exercise & weight monitoring are important for maintaining a healthy lifestyle    You may be retaining fluid if you have a history of heart failure or if you experience any of the following symptoms:  Weight gain of 3 pounds or more overnight or 5 pounds in a week, increased swelling in our hands or feet or shortness of breath while lying flat in bed. Please call your doctor as soon as you notice any of these symptoms; do not wait until your next office visit. Recognize signs and symptoms of STROKE:    F-face looks uneven    A-arms unable to move or move unevenly    S-speech slurred or non-existent    T-time-call 911 as soon as signs and symptoms begin-DO NOT go       Back to bed or wait to see if you get better-TIME IS BRAIN. Warning Signs of HEART ATTACK     Call 911 if you have these symptoms:   Chest discomfort. Most heart attacks involve discomfort in the center of the chest that lasts more than a few minutes, or that goes away and comes back. It can feel like uncomfortable pressure, squeezing, fullness, or pain.  Discomfort in other areas of the upper body. Symptoms can include pain or discomfort in one or both arms, the back, neck, jaw, or stomach.  Shortness of breath with or without chest discomfort.  Other signs may include breaking out in a cold sweat, nausea, or lightheadedness. Don't wait more than five minutes to call 911 - MINUTES MATTER! Fast action can save your life. Calling 911 is almost always the fastest way to get lifesaving treatment. Emergency Medical Services staff can begin treatment when they arrive -- up to an hour sooner than if someone gets to the hospital by car. The discharge information has been reviewed with the caregiver. The caregiver verbalized understanding.     Discharge medications reviewed with the caregiver and appropriate educational materials and side effects teaching were provided. [employer] Activation    Thank you for requesting access to [employer]. Please follow the instructions below to securely access and download your online medical record. [employer] allows you to send messages to your doctor, view your test results, renew your prescriptions, schedule appointments, and more. How Do I Sign Up? 1. In your internet browser, go to [email or web address]. 2. Click on the First Time User? Click Here link in the Sign In box. You will see the New Member Sign Up page. 3. Enter your [employer] Access Code exactly as it appears below. You will not need to use this code after youve completed the sign-up process. If you do not sign up before the expiration date, you must request a new code. [employer] Access Code: Kavon Jacobs  Expires: 2017  2:11 PM (This is the date your [employer] access code will )    4. Enter the last four digits of your Social Security Number (xxxx) and Date of Birth (mm/dd/yyyy) as indicated and click Submit. You will be taken to the next sign-up page. 5. Create a [employer] ID. This will be your [employer] login ID and cannot be changed, so think of one that is secure and easy to remember. 6. Create a [employer] password. You can change your password at any time. 7. Enter your Password Reset Question and Answer. This can be used at a later time if you forget your password. 8. Enter your e-mail address. You will receive e-mail notification when new information is available in [address] 9. Click Sign Up. You can now view and download portions of your medical record. 10. Click the Download Summary menu link to download a portable copy of your medical information. Additional Information    If you have questions, please visit the Frequently Asked Questions section of the [employer] website at [email or web address] Remember, [employer] is NOT to be used for urgent needs. For medical emergencies, dial 911.     Patient armband removed and shredded

## 2017-02-15 NOTE — INTERDISCIPLINARY ROUNDS
IDR/SLIDR Summary          Patient: Michael Laguna MRN: [id number]    Age: 80 y.o. YOB: 1926 Room/Bed: [facility]/   Admit Diagnosis: CAP (community acquired pneumonia)  Principal Diagnosis: <principal problem not specified>   Goals: pain control, go home  Readmission: NO  Quality Measure: PNA  VTE Prophylaxis: Chemical  Influenza Vaccine screening completed? YES declined  Pneumococcal Vaccine screening completed? NO  Mobility needs: Yes   Nutrition plan:Yes  Consults:P.T, O.T. and Case Management    Financial concerns:No  Escalated to CM? YES  RRAT Score: 22   Interventions:Home Health  Testing due for pt today?  NO  LOS: 5 days Expected length of stay 4 days  Discharge plan: home [facility] [address]   PCP: [place], MD  Transportation needs: Yes    Days before discharge:ready for discharge  Discharge disposition: Home    Signed:     Gina Gonzalez RN  2/15/2017  11:11 AM

## 2017-02-15 NOTE — ROUTINE PROCESS
Bedside shift change report given to GREGORIO Gifford (oncoming nurse) by Dorsie Canavan RN (offgoing nurse). Report given with SBAR, Kardex, Intake/Output, MAR and Recent Results.

## 2017-02-15 NOTE — ROUTINE PROCESS
Assumed patient care. Patient in bed, awake, alert and oriented to self. . Denies pain or discomforts at this time. Call light placed within reach. Bed in low position.

## 2017-02-15 NOTE — PROGRESS NOTES
Patient has dc order home with [facility]. Med transport arranged with [facility],  time at 1430 or 2:30 PM, pt's daughter will be traveling with her. Prescribed medication filled. All needed paperwork faxed to [facility], DME-portable O2. Adi Penaloza rn, cm    5:07 PM received call from [facility] choice rep stating patient is on their list for tomorrow delivery for O2 portable tanks.

## 2017-02-15 NOTE — PROGRESS NOTES
Received report on pt.from off going RN. Resting quietly in bed on rounds. Turned and repositioned for comfort per pt request. No distress noted. Will monitor for any changes in status. 1000 assisted up into chair with 2 staff. No distress noted. Call light at side. Reminded not to get up without help. 1300 02 removed for 02 sat checks. 1315 02 sat at rest 87%. 02 reapplied at 2 LPM.     1440 pt discharged home per ambulance. Daughter was given a copy of discharge instructions and medication that was filled in outpatient pharmacy.  No distress noted at time of transport

## 2017-04-11 PROBLEM — R62.7 FAILURE TO THRIVE IN ADULT: Status: ACTIVE | Noted: 2017-01-01

## 2017-04-11 PROBLEM — R53.1 WEAKNESS: Status: ACTIVE | Noted: 2017-01-01

## 2017-04-11 PROBLEM — R26.2 INABILITY TO AMBULATE DUE TO MULTIPLE JOINTS: Status: ACTIVE | Noted: 2017-01-01

## 2017-04-11 PROBLEM — R53.1 WEAKNESS GENERALIZED: Status: ACTIVE | Noted: 2017-01-01

## 2017-04-11 NOTE — IP AVS SNAPSHOT
Current Discharge Medication List  
  
CONTINUE these medications which have NOT CHANGED Dose & Instructions Dispensing Information Comments Morning Noon Evening Bedtime  
 albuterol-ipratropium 2.5 mg-0.5 mg/3 ml Nebu Commonly known as:  Marino Winter Your last dose was: Your next dose is:    
   
   
 Dose:  3 mL  
3 mL by Nebulization route every six (6) hours as needed. prn wheezing Refills:  0  
     
   
   
   
  
 amLODIPine 5 mg tablet Commonly known as:  Radhamonica Skelton Your last dose was: Your next dose is: TAKE ONE TABLET BY MOUTH ONCE DAILY Quantity:  30 Tab Refills:  6  
     
   
   
   
  
 diazePAM 5 mg tablet Commonly known as:  VALIUM Your last dose was: Your next dose is:    
   
   
 Dose:  5 mg Take 5 mg by mouth once as needed. prn sleep Refills:  0 HYDROcodone-acetaminophen 5-325 mg per tablet Commonly known as:  Marlaine Pedro Pablo Your last dose was: Your next dose is:    
   
   
 Dose:  1 Tab Take 1 Tab by mouth three (3) times daily. prn pain Refills:  0  
     
   
   
   
  
 isosorbide dinitrate 20 mg tablet Commonly known as:  ISORDIL Your last dose was: Your next dose is:    
   
   
 Dose:  20 mg Take 1 Tab by mouth two (2) times a day. Quantity:  60 Tab Refills:  6 NEURONTIN 300 mg capsule Generic drug:  gabapentin Your last dose was: Your next dose is:    
   
   
 Dose:  300 mg Take 300 mg by mouth three (3) times daily. Refills:  0  
     
   
   
   
  
 timolol 0.5 % ophthalmic gel-forming Commonly known as:  TIMOPTIC-XE Your last dose was: Your next dose is:    
   
   
 Dose:  1 Drop Administer 1 Drop to both eyes daily. Refills:  0 STOP taking these medications   
 levoFLOXacin 500 mg tablet Commonly known as:  Nawaf Bella  
   
  
  
 ASK your doctor about these medications Dose & Instructions Dispensing Information Comments Morning Noon Evening Bedtime OXYGEN-AIR DELIVERY SYSTEMS Your last dose was: Your next dose is:    
   
   
 Dose:  2 L  
2 L by Nasal route as needed. for SOB Refills:  0

## 2017-04-11 NOTE — ED PROVIDER NOTES
HPI Comments: 11:39 AM Ivy Ortega is a 80 y.o. female with a h/o HTN, CHF, CAD, Arthritis, and Smoking . 25PPD presents to the ED via EMS with a family membr with c/o bilateral lower leg pain onset this morning. Per family states pt has had diffuse joint pain. Pt states that she feels \"so-so\". Pt reports HA onset 1 week ao s/p falling on tile floor in the bathroom. Pt denies nausea, vomiting, diarrhea, chest pain, or cough. All other sx denied. No other complaints at this time. PCP-Kulwinder Mejia MD      Patient is a 80 y.o. female presenting with back pain, joint pain, and hypertension. The history is provided by the patient. Back Pain      Joint Pain     Hypertension           Past Medical History:   Diagnosis Date    Arthritis     Atypical chest pain     CAD (coronary artery disease)     Cardiac echocardiogram 11/13/2014    EF 60%. No RWMA. Mild-mod LVH. Gr 1 DDfx. High ventricular filling pressure. RVSP 45 mmHg. Mod-severe LAE. Mild MR.  Mild-mod TR.  Cardiac nuclear imaging test 12/14/2009    No evidence of ischemia or infarction. Mild prox inferior, inferoseptal hypk. EF 63%.  Carotid duplex 11/13/2014    Mild < 50% bilateral ICA stenosis. Significant LECA tortuousity.  CHF (congestive heart failure) (HCC)     Diabetes mellitus ([address]     Dilated cardiomyopathy (HCC)     EF now improved to 58% by gated SPECT imaging in December 2005    Fibrotic lung diseases ([facility] [address]     Hypertension        Past Surgical History:   Procedure Laterality Date    BIOPSY OF BREAST, INCISIONAL      left breast         Family History:   Problem Relation Age of Onset    Heart Disease Mother     Heart Disease Sister     Heart Disease Brother     Diabetes Brother     Diabetes Sister        Social History     Social History    Marital status: SINGLE     Spouse name: N/A    Number of children: N/A    Years of education: N/A     Occupational History    Not on file.      Social History Main Topics    Smoking status: Former Smoker     Packs/day: 0.25     Years: 10.00     Quit date: 3/2/1971    Smokeless tobacco: Never Used    Alcohol use No    Drug use: No    Sexual activity: Not on file     Other Topics Concern    Not on file     Social History Narrative         ALLERGIES: Aspirin and Other medication    Review of Systems   Musculoskeletal: Positive for back pain and joint pain. All other systems reviewed and are negative. Vitals:    04/11/17 1126   BP: (!) 189/99   Pulse: 77   Resp: 16   Temp: 98 °F (36.7 °C)   SpO2: 99%   Weight: 40.4 kg (89 lb)   Height: 5' 5\" (1.651 m)            Physical Exam   Constitutional: She is oriented to person, place, and time. Frail and elderly     HENT:   Head: Normocephalic and atraumatic. Eyes: EOM are normal. Pupils are equal, round, and reactive to light. Disconjugate gaze   Neck: Normal range of motion. Neck supple. Cardiovascular: Normal rate, regular rhythm and normal heart sounds. Exam reveals no friction rub. No murmur heard. Pulmonary/Chest: Effort normal and breath sounds normal. No respiratory distress. She has no wheezes. Abdominal: Soft. She exhibits no distension. There is no tenderness. There is no rebound and no guarding. Musculoskeletal: Normal range of motion. Nearly no muscle. Diffusely   Unable to lift legs off bed bilaterally   Neurological: She is alert and oriented to person, place, and time. Skin: Skin is warm and dry. Psychiatric: She has a normal mood and affect. Her behavior is normal. Thought content normal.        MDM  Number of Diagnoses or Management Options  Diagnosis management comments: Patient brought in by family for concerns of pain to her joints however really was going on is she is not moving at all. She become so weak that she cannot assist family and rolling her lifting herself. She cannot lift her legs off the bed. Her muscles of atrophy to the point where they are almost nonexistent. EKG: Sinus at 66,  Normal axis left bundle and 126 and QTC of 492 otherwise normal intervals no ST elevation or depression or hypertrophy  To the EKG done February 10, 2017 there was at that time a incomplete left bundle otherwise unchanged however that read notes that prior to that. Been a complete left bundle so ultimately a waxing and waning left bundle     seen by Dr. Nela Duran and he will admit her here. Generalized weakness failure to thrive, dehydration. (clinically- veins collapse  On US). Ultimately poor prognosis patient is a DNR patient is likely deteriorating towards natural death. ED Course       Procedures                       Scribe Attestation  Rick Diaz scribing for and in the presence of Matheus Huggins MD 11:48 AM, 04/11/17. Physician Attestation  I personally performed the services described in the documentation, reviewed the documentation, as recorded by the scribe in my presence, and it accurately and completely records my words and actions.     Matheus Huggins MD 11:48 AM 04/11/17        Signed by : Elaine Ramires, 04/11/17 at 11:48 AM

## 2017-04-11 NOTE — H&P
History and Physical    Patient: Shiva Strickland MRN: [id number]  SSN: [id number]    YOB: 1926  Age: 80 y.o. Sex: female      Subjective:      Shiva Strickland is a 80 y.o. female who has been having steady decline in ability to ambulate and appetite has decreased. This am, patient could not get out of bed. Brought top the ER. Patient awake but frail and not able to stand. Will admit for weakness with ataxia and FTT. Past Medical History:   Diagnosis Date    Arthritis     Atypical chest pain     CAD (coronary artery disease)     Cardiac echocardiogram 11/13/2014    EF 60%. No RWMA. Mild-mod LVH. Gr 1 DDfx. High ventricular filling pressure. RVSP 45 mmHg. Mod-severe LAE. Mild MR.  Mild-mod TR.  Cardiac nuclear imaging test 12/14/2009    No evidence of ischemia or infarction. Mild prox inferior, inferoseptal hypk. EF 63%.  Carotid duplex 11/13/2014    Mild < 50% bilateral ICA stenosis. Significant LECA tortuousity.  CHF (congestive heart failure) (HCC)     Diabetes mellitus ([address]     Dilated cardiomyopathy (HCC)     EF now improved to 58% by gated SPECT imaging in December 2005    Fibrotic lung diseases ([facility]     Hypertension      Past Surgical History:   Procedure Laterality Date    BIOPSY OF BREAST, INCISIONAL      left breast      Family History   Problem Relation Age of Onset    Heart Disease Mother     Heart Disease Sister     Heart Disease Brother     Diabetes Brother     Diabetes Sister      Social History   Substance Use Topics    Smoking status: Former Smoker     Packs/day: 0.25     Years: 10.00     Quit date: 3/2/1971    Smokeless tobacco: Never Used    Alcohol use No      Prior to Admission medications    Medication Sig Start Date End Date Taking? Authorizing Provider   HYDROcodone-acetaminophen (NORCO) 5-325 mg per tablet Take 1 Tab by mouth three (3) times daily.  prn pain   Yes Historical Provider   amLODIPine (NORVASC) 5 mg tablet TAKE ONE TABLET BY MOUTH ONCE DAILY 8/9/16  Yes Luis Miguel Sparrow MD   isosorbide dinitrate (ISORDIL) 20 mg tablet Take 1 Tab by mouth two (2) times a day. 12/22/15  Yes Luis Miguel Sparrow MD   gabapentin (NEURONTIN) 300 mg capsule Take 300 mg by mouth three (3) times daily. Yes Historical Provider   timolol (TIMOPTIC-XR) 0.5 % ophthalmic gel-forming Administer 1 Drop to both eyes daily. Yes Historical Provider   diazepam (VALIUM) 5 mg tablet Take 5 mg by mouth once as needed. prn sleep 3/2/11  Yes Historical Provider   OXYGEN-AIR DELIVERY SYSTEMS 2 L by Nasal route as needed. for SOB    Historical Provider   levoFLOXacin (LEVAQUIN) 500 mg tablet Take 1 Tab by mouth daily. 2/15/17   Kathya Pederson MD   albuterol-ipratropium (DUO-NEB) 2.5 mg-0.5 mg/3 ml nebulizer solution 3 mL by Nebulization route every six (6) hours as needed. prn wheezing    Historical Provider        Allergies   Allergen Reactions    Aspirin Other (comments)     Severe nosebleeds    Other Medication Hives     Any CILLINS (HIVES)       Review of Systems:  Review of systems not obtained due to patient factors. Objective:     Vitals:    04/11/17 1126   BP: (!) 189/99   Pulse: 77   Resp: 16   Temp: 98 °F (36.7 °C)   SpO2: 99%   Weight: 40.4 kg (89 lb)   Height: 5' 5\" (1.651 m)        Physical Exam:  GENERAL: alert, mild distress, appears older than stated age  THROAT & NECK: normal and no erythema or exudates noted. LUNG: clear to auscultation bilaterally  HEART: regular rate and rhythm, S1, S2 normal, no murmur, click, rub or gallop  ABDOMEN: soft, non-tender.  Bowel sounds normal. No masses,  no organomegaly  EXTREMITIES:  no edema    Assessment:     Hospital Problems  Date Reviewed: 4/11/2017          Codes Class Noted POA    Weakness generalized ICD-10-CM: R53.1  ICD-9-CM: 780.79  4/11/2017 Unknown        Inability to ambulate due to multiple joints ICD-10-CM: R26.2  ICD-9-CM: 719.7  4/11/2017 Unknown        Hypertension ICD-10-CM: I10  ICD-9-CM: 401.9 Unknown Yes        Fibrotic lung diseases ([facility] ICD-10-CM: J84.10  ICD-9-CM: 615  Unknown Yes              Plan:     Admit  Blood work IV hydration   OT and PT.       Signed By: Thad Morataya MD     April 11, 2017

## 2017-04-11 NOTE — IP AVS SNAPSHOT
Shelia Alexander 
 
 
 [address] [facility] [address] Patient: Samy Dixon MRN: [id number] You are allergic to the following Allergen Reactions Aspirin Other (comments) Severe nosebleeds Other Medication Hives Any CILLINS (HIVES) Recent Documentation Height Weight Breastfeeding? BMI OB Status Smoking Status 1.651 m 42.5 kg No 15.58 kg/m2 Postmenopausal Former Smoker Emergency Contacts Name Discharge Info Relation Home Work Mobile [facility] DISCHARGE CAREGIVER [3] Child [2] [phone] About your hospitalization You were admitted on:  2017 You last received care in the:  [facility] [id number] [place] [address]. You were discharged on:  April 15, 2017 Unit phone number:  [phone] Why you were hospitalized Your primary diagnosis was:  Not on File Your diagnoses also included:  Fibrotic Lung Diseases (Hcc), Hypertension, Weakness Generalized, Inability To Ambulate Due To Multiple Joints, Weakness, Failure To Thrive In Adult Providers Seen During Your Hospitalizations Provider Role Specialty Primary office phone Victor Manuel Tucker MD Attending Provider Emergency Medicine [phone] Damir Krause MD Attending Provider [facility] [phone] Your Primary Care Physician (PCP) Primary Care Physician Office Phone Office Fax [facility] [phone] Follow-up Information Follow up With Details Comments Contact Info Damir Krause MD   [address] [facility] [place] [address] [phone] Current Discharge Medication List  
  
CONTINUE these medications which have NOT CHANGED Dose & Instructions Dispensing Information Comments Morning Noon Evening Bedtime  
 albuterol-ipratropium 2.5 mg-0.5 mg/3 ml Nebu Commonly known as:  German Arreguin  
 Your last dose was: Your next dose is:    
   
   
 Dose:  3 mL  
3 mL by Nebulization route every six (6) hours as needed. prn wheezing Refills:  0  
     
   
   
   
  
 amLODIPine 5 mg tablet Commonly known as:  Melanie Gastelum Your last dose was: Your next dose is: TAKE ONE TABLET BY MOUTH ONCE DAILY Quantity:  30 Tab Refills:  6  
     
   
   
   
  
 diazePAM 5 mg tablet Commonly known as:  VALIUM Your last dose was: Your next dose is:    
   
   
 Dose:  5 mg Take 5 mg by mouth once as needed. prn sleep Refills:  0 HYDROcodone-acetaminophen 5-325 mg per tablet Commonly known as:  Jarome Jaime Your last dose was: Your next dose is:    
   
   
 Dose:  1 Tab Take 1 Tab by mouth three (3) times daily. prn pain Refills:  0  
     
   
   
   
  
 isosorbide dinitrate 20 mg tablet Commonly known as:  ISORDIL Your last dose was: Your next dose is:    
   
   
 Dose:  20 mg Take 1 Tab by mouth two (2) times a day. Quantity:  60 Tab Refills:  6 NEURONTIN 300 mg capsule Generic drug:  gabapentin Your last dose was: Your next dose is:    
   
   
 Dose:  300 mg Take 300 mg by mouth three (3) times daily. Refills:  0  
     
   
   
   
  
 timolol 0.5 % ophthalmic gel-forming Commonly known as:  TIMOPTIC-XE Your last dose was: Your next dose is:    
   
   
 Dose:  1 Drop Administer 1 Drop to both eyes daily. Refills:  0 STOP taking these medications   
 levoFLOXacin 500 mg tablet Commonly known as:  Marylu Siddiqui your doctor about these medications Dose & Instructions Dispensing Information Comments Morning Noon Evening Bedtime OXYGEN-AIR DELIVERY SYSTEMS Your last dose was: Your next dose is:    
   
   
 Dose:  2 L  
2 L by Nasal route as needed. for SOB Refills:  0 Discharge Instructions MyChart Activation Thank you for requesting access to [facility]. Please follow the instructions below to securely access and download your online medical record. [facility] allows you to send messages to your doctor, view your test results, renew your prescriptions, schedule appointments, and more. How Do I Sign Up? 1. In your internet browser, go to [email or web address] 
2. Click on the First Time User? Click Here link in the Sign In box. You will be redirect to the New Member Sign Up page. 3. Enter your [facility] Access Code exactly as it appears below. You will not need to use this code after youve completed the sign-up process. If you do not sign up before the expiration date, you must request a new code. [facility] Access Code: [email or web address] Expires: 2017  3:11 PM (This is the date your [facility] access code will ) 4. Enter the last four digits of your Social Security Number (xxxx) and Date of Birth (mm/dd/yyyy) as indicated and click Submit. You will be taken to the next sign-up page. 5. Create a [facility] ID. This will be your [facility] login ID and cannot be changed, so think of one that is secure and easy to remember. 6. Create a [facility] password. You can change your password at any time. 7. Enter your Password Reset Question and Answer. This can be used at a later time if you forget your password. 8. Enter your e-mail address. You will receive e-mail notification when new information is available in [address] 9. Click Sign Up. You can now view and download portions of your medical record. 10. Click the Download Summary menu link to download a portable copy of your medical information. Additional Information If you have questions, please visit the Frequently Asked Questions section of the [facility] website at [email or web address] Remember, MyChart is NOT to be used for urgent needs. For medical emergencies, dial 911. Patient armband removed and shredded DISCHARGE SUMMARY from Nurse The following personal items are in your possession at time of discharge: 
 
Dental Appliances: None Visual Aid: None Home Medications: None Jewelry: None Clothing: None Other Valuables: None PATIENT INSTRUCTIONS: 
 
 
F-face looks uneven A-arms unable to move or move unevenly S-speech slurred or non-existent T-time-call 911 as soon as signs and symptoms begin-DO NOT go Back to bed or wait to see if you get better-TIME IS BRAIN. Warning Signs of HEART ATTACK Call 911 if you have these symptoms: 
? Chest discomfort. Most heart attacks involve discomfort in the center of the chest that lasts more than a few minutes, or that goes away and comes back. It can feel like uncomfortable pressure, squeezing, fullness, or pain. ? Discomfort in other areas of the upper body. Symptoms can include pain or discomfort in one or both arms, the back, neck, jaw, or stomach. ? Shortness of breath with or without chest discomfort. ? Other signs may include breaking out in a cold sweat, nausea, or lightheadedness. Don't wait more than five minutes to call [address]! Fast action can save your life. Calling 911 is almost always the fastest way to get lifesaving treatment. Emergency Medical Services staff can begin treatment when they arrive  up to an hour sooner than if someone gets to the hospital by car. The discharge information has been reviewed with the caregiver.   The caregiver verbalized understanding. Discharge medications reviewed with the caregiver and appropriate educational materials and side effects teaching were provided. Discharge Orders None [facility] Announcement We are excited to announce that we are making your provider's discharge notes available to you in [employer]. You will see these notes when they are completed and signed by the physician that discharged you from your recent hospital stay. If you have any questions or concerns about any information you see in [facility], please call the Health Information Department where you were seen or reach out to your Primary Care Provider for more information about your plan of care. Introducing [facility]! Bhupendra Troy introduces [employer]works patient portal. Now you can access parts of your medical record, email your doctor's office, and request medication refills online. 1. In your internet browser, go to [email or web address] 2. Click on the First Time User? Click Here link in the Sign In box. You will see the New Member Sign Up page. 3. Enter your [facility] Access Code exactly as it appears below. You will not need to use this code after youve completed the sign-up process. If you do not sign up before the expiration date, you must request a new code. · [facility] Access Code: [email or web address] Expires: 4/19/2017  3:11 PM 
 
4. Enter the last four digits of your Social Security Number (xxxx) and Date of Birth (mm/dd/yyyy) as indicated and click Submit. You will be taken to the next sign-up page. 5. Create a [facility] ID. This will be your [facility] login ID and cannot be changed, so think of one that is secure and easy to remember. 6. Create a [facility] password. You can change your password at any time. 7. Enter your Password Reset Question and Answer. This can be used at a later time if you forget your password. 8. Enter your e-mail address. You will receive e-mail notification when new information is available in [address] 9. Click Sign Up. You can now view and download portions of your medical record. 10. Click the Download Summary menu link to download a portable copy of your medical information. If you have questions, please visit the Frequently Asked Questions section of the FastDue website. Remember, [employer] is NOT to be used for urgent needs. For medical emergencies, dial 911. Now available from your iPhone and Android! General Information Please provide this summary of care documentation to your next provider. Patient Signature:  ____________________________________________________________ Date:  ____________________________________________________________  
  
[facility] Provider Signature:  ____________________________________________________________ Date:  ____________________________________________________________

## 2017-04-11 NOTE — ED TRIAGE NOTES
Patient to ER by EMS with c/o generalized pain and stiffness progressing since discharge from hospital in February. Patient also hypertensive. Arrived with daughter. Daughter reports increase in difficulty moving patient at home, wheelchair at baseline.

## 2017-04-12 NOTE — ROUTINE PROCESS
Bedside and Verbal shift change report given to STANTON Gunderson. Report included the following information SBAR, Kardex, Intake/Output, MAR and Recent Results.

## 2017-04-12 NOTE — ED NOTES
TRANSFER - OUT REPORT:    Verbal report given to Jono Walker RN(name) on Ivy Alvares Stalls  being transferred to (unit) for routine progression of care       Report consisted of patients Situation, Background, Assessment and   Recommendations(SBAR). Information from the following report(s) SBAR was reviewed with the receiving nurse. Lines:   Peripheral IV 04/11/17 Left Antecubital (Active)   Site Assessment Clean, dry, & intact 4/11/2017  4:04 PM   Phlebitis Assessment 0 4/11/2017  4:04 PM   Infiltration Assessment 0 4/11/2017  4:04 PM   Dressing Status Clean, dry, & intact 4/11/2017  4:04 PM       Peripheral IV 04/11/17 Right External jugular (Active)   Site Assessment Clean, dry, & intact 4/11/2017  4:04 PM   Phlebitis Assessment 0 4/11/2017  4:04 PM   Infiltration Assessment 0 4/11/2017  4:04 PM   Dressing Status Clean, dry, & intact 4/11/2017  4:04 PM        Opportunity for questions and clarification was provided.       Patient transported with:   [employer]

## 2017-04-12 NOTE — PROGRESS NOTES
Progress Note    Patient: Charu Rivas MRN: [id number]  SSN: [id number]    YOB: 1926  Age: 80 y.o. Sex: female      Admit Date: 4/11/2017    LOS: 1 day     Subjective:     Patient admitted due to FTT with increased weakness and inability to stand. . Had elevated blood pressure but now improved. Objective:     Vitals:    04/12/17 0400 04/12/17 0516 04/12/17 0800 04/12/17 1200   BP: 125/76  146/84 152/72   Pulse: (!) 48  (!) 46 (!) 42   Resp: 16  16 16   Temp: 97.6 °F (36.4 °C)  98.1 °F (36.7 °C) 98 °F (36.7 °C)   SpO2: 98%  100% 100%   Weight:  39.3 kg (86 lb 9.6 oz)     Height:            Intake and Output:  Current Shift:    Last three shifts: 04/10 1901 - 04/12 0700  In: 605 [I.V.:605]  Out: 0     Physical Exam:   GENERAL: alert, mild distress, appears older than stated age  LUNG: clear to auscultation bilaterally  HEART: regular rate and rhythm  ABDOMEN: soft, non-tender. Bowel sounds normal. No masses,  no organomegaly    Lab/Data Review: All lab results for the last 24 hours reviewed. Glucose 96      Assessment:     Active Problems:    Hypertension ()      Fibrotic lung diseases (HCC) ()      Weakness generalized (4/11/2017)      Inability to ambulate due to multiple joints (4/11/2017)      Weakness (4/11/2017)      Failure to thrive in adult (4/11/2017)        Plan:     Continue hydration Hospice consult.     Signed By: Brian Garcia MD     April 12, 2017

## 2017-04-12 NOTE — PROGRESS NOTES
NUTRITION    Nutrition Screen      RECOMMENDATIONS / PLAN:     - Add ground meats and cooked vegetables to diet order  - Add supplement: Ensure Pudding TID  - Update food preferences  - Assistance with meals  - Continue RD inpatient monitoring and evaluation. NUTRITION INTERVENTIONS & DIAGNOSIS:     [x] Meals/Snacks: modified diet  [x] Medical food supplementation: add    Nutrition Diagnosis:  Difficulty chewing related to missing teeth as evidenced by pt unable to eat some whole foods  Underweight related to inadequate energy intake as evidenced by BMI 14.4 kg/m^2    ASSESSMENT:     Subjective/Objective:  Patient with good appetite and meal intake PTA per daughter report. Needs assistance with meals per daughter and nursing. Is missing teeth; unable to tolerate some foods. Discussed adding chopped meats and cooked vegetables; daughter agreed with plan. Discussed adding nutrition supplement; daughter agreed with plan; options discussed. Pt unable to tolerate Ensure drinks or similar products. Discussed food preferences    Average po intake adequate to meet patients estimated nutritional needs:   [] Yes     [] No   [x] Unable to determine at this time    Diet: DIET DYSPHAGIA ADVANCED SOFT (NDD3)      Food Allergies:  None known   Current Appetite:   [x] Good     [] Fair     [] Poor     [] Other:  Appetite/meal intake prior to admission:   [x] Good     [] Fair     [] Poor     [] Other:  Feeding Limitations:  [] Swallowing difficulty    [x] Chewing difficulty: missing teeth    [] Other:  Current Meal Intake: No data found.       BM: unknown     Skin Integrity:  No pressure ulcer or wound  Edema:  None   Pertinent Medications: Reviewed: D5-1/2NS at 50 mL/hr (60 gm dextrose, 204 kcal)    Recent Labs      04/11/17   1600   NA  139   K  3.8   CL  105   CO2  29   GLU  100*   BUN  25*   CREA  1.23   CA  9.5   ALB  2.7*   SGOT  24   ALT  12*       Intake/Output Summary (Last 24 hours) at 04/12/17 1358  Last data filed at 04/12/17 0606   Gross per 24 hour   Intake              605 ml   Output                0 ml   Net              605 ml       Anthropometrics:  Ht Readings from Last 1 Encounters:   04/11/17 5' 5\" (1.651 m)     Last 3 Recorded Weights in this Encounter    04/11/17 1126 04/12/17 0516   Weight: 40.4 kg (89 lb) 39.3 kg (86 lb 9.6 oz)     Body mass index is 14.41 kg/(m^2). Weight History:   No recent changes in weight PTA per daughter report. Noted 8 lb (8.5%) weight loss in past 2 months PTA per chart hx    Weight Metrics 4/12/2017 2/15/2017 7/19/2016 1/22/2016 12/22/2015 6/23/2015 5/20/2015   Weight 86 lb 9.6 oz 94 lb 9.6 oz 97 lb 86 lb 89 lb 99 lb 104 lb   BMI 14.41 kg/m2 15.74 kg/m2 16.14 kg/m2 14.31 kg/m2 14.37 kg/m2 15.99 kg/m2 16.79 kg/m2        Admitting Diagnosis: Weakness  Failure to thrive in adult  Past Medical History:   Diagnosis Date    Arthritis     Atypical chest pain     CAD (coronary artery disease)     Cardiac echocardiogram 11/13/2014    EF 60%. No RWMA. Mild-mod LVH. Gr 1 DDfx. High ventricular filling pressure. RVSP 45 mmHg. Mod-severe LAE. Mild MR.  Mild-mod TR.  Cardiac nuclear imaging test 12/14/2009    No evidence of ischemia or infarction. Mild prox inferior, inferoseptal hypk. EF 63%.  Carotid duplex 11/13/2014    Mild < 50% bilateral ICA stenosis. Significant LECA tortuousity.       CHF (congestive heart failure) (HCC)     Diabetes mellitus ([facility]     Dilated cardiomyopathy (HCC)     EF now improved to 58% by gated SPECT imaging in December 2005    Fibrotic lung diseases ([facility]     Hypertension        Education Needs:        [x] None identified  [] Identified - Not appropriate at this time  []  Identified and addressed - refer to education log  Learning Limitations:   [x] None identified  [x] Identified    Cultural, Evangelical & ethnic food preferences:  [x] None identified    [] Identified and addressed     ESTIMATED NUTRITION NEEDS:     Calories: 5234-1286 kcal (MSJx1.2-1.3) based on  [] Actual BW      [x] IBW: 57 kg  Protein: 46-57 gm (0.8-1 gm/kg) based on  [] Actual BW      [x] IBW   Fluid: 1 mL/kcal     MONITORING & EVALUATION:     Nutrition Goal(s):   1. Po intake of meals will meet >75% of patient estimated nutritional needs within the next 7 days.   Outcome:  [] Met/Ongoing    []  Not Met    [x] New/Initial Goal     Monitoring:   [x] Diet tolerance   [x] Meal intake   [x] Supplement intake   [] GI symptoms/ability to tolerate po diet   [] Respiratory status   [] Plan of care      Previous Recommendations (for follow-up assessments only):     []   Implemented       []   Not Implemented (RD to address)     [] No Recommendation Made     Discharge Planning:  Regular diet; consistency as tolerates  [x] Participated in care planning, discharge planning, & interdisciplinary rounds as appropriate      Kayley Najera, [address] [place] [address]   Pager: [phone]

## 2017-04-12 NOTE — ROUTINE PROCESS
TRANSFER - IN REPORT:    Verbal report received from [facility]  ED for routine progression of care      Report consisted of patients Situation, Background, Assessment and   Recommendations(SBAR). Information from the following report(s) SBAR, Kardex, ED Summary, Intake/Output, MAR and Recent Results was reviewed with the receiving nurse. Opportunity for questions and clarification was provided. Assessment completed upon patients arrival to unit and care assumed.      Primary Nurse Ainsley Weiss RN and Lore Harper RN performed a dual skin assessment on this patient No impairment noted  Domingo score is 19

## 2017-04-12 NOTE — PROGRESS NOTES
Problem: Mobility Impaired (Adult and Pediatric)  Goal: *Acute Goals and Plan of Care (Insert Text)  Outcome: Resolved/Met Date Met:  04/12/17  PHYSICAL THERAPY EVALUATION & DISCHARGE     Patient: Nahid Ha (27 y.o. female)  Date: 4/12/2017  Primary Diagnosis: Weakness  Failure to thrive in adult        Precautions:  None      ASSESSMENT AND RECOMMENDATIONS:  Pt is 89yo F admitted to hospital for weakness/failure to thrive. Pt presents alert, and confused, but agreeable to therapy with encouragement. Pt transferred to EOB with Total Assist and required constant support while sitting EOB. Pt was then transferred back to supine and scooted to [facility] with Total Assist. Pt's daughter entered room during tx and provides history that pt is non-ambulatory and that they have a home nurse come from 10a-4pm who get the patient out of bed and into a WC each day. They do not have a sukhjinder lift but pt is heavy assist and daughter states that her mother normally requires heavy assist to remain seated without losing her balance. As patient is functioning at baseline of Total Assist as reported by daughter, skilled physical therapy is not indicated at this time. Discharge Recommendations: Home Health with supervision and continued home nursing vs LTC facility depending on family's abiltiy to assist patient  Further Equipment Recommendations for Discharge: N/A        G-:CODES      Mobility  Current  CN= 100%   Goal  CN= 100%  D/C  CN= 100%. The severity rating is based on the Level of Assistance required for Functional Mobility and ADLs.          Evaluation Complexity      Eval Complexity: History: HIGH Complexity :3+ comorbidities / personal factors will impact the outcome/ POC Exam:MEDIUM Complexity : 3 Standardized tests and measures addressing body structure, function, activity limitation and / or participation in recreation  Presentation: LOW Complexity : Stable, uncomplicated  Clinical Decision Making:Low Complexity   Overall Complexity:LOW       SUBJECTIVE:   Patient stated My walker is over there in my daughter's room.  Pt oriented to self only. OBJECTIVE DATA SUMMARY:       Past Medical History:   Diagnosis Date    Arthritis      Atypical chest pain      CAD (coronary artery disease)      Cardiac echocardiogram 11/13/2014     EF 60%. No RWMA. Mild-mod LVH. Gr 1 DDfx. High ventricular filling pressure. RVSP 45 mmHg. Mod-severe LAE. Mild MR.  Mild-mod TR.  Cardiac nuclear imaging test 12/14/2009     No evidence of ischemia or infarction. Mild prox inferior, inferoseptal hypk. EF 63%.  Carotid duplex 11/13/2014     Mild < 50% bilateral ICA stenosis. Significant LECA tortuousity.  CHF (congestive heart failure) (HCC)      [facility] ([facility]      Dilated cardiomyopathy (HCC)       EF now improved to 58% by gated SPECT imaging in December 2005    Fibrotic lung diseases ([facility]      Hypertension       Past Surgical History:   Procedure Laterality Date    BIOPSY OF BREAST, INCISIONAL         left breast     Barriers to Learning/Limitations: None  Compensate with: visual, verbal, tactile, kinesthetic cues/model  Prior Level of Function/Home Situation: Pt lives   Home Situation  Home Environment: Private residence  One/Two Story Residence: One story  Living Alone: No  Support Systems: Family member(s)  Patient Expects to be Discharged to[de-identified] Private residence  Current DME Used/Available at Home: Wheelchair  Critical Behavior:    Pleasant, cooperative, alert, oriented to self only  Strength:    Strength: Grossly decreased, non-functional (BLE)   Tone & Sensation:   Tone: Normal (BLE)   Sensation: Intact (BLE withdrawl from noxious stim)   Range Of Motion:  AROM: Grossly decreased, non-functional (BLE)   Functional Mobility:  Bed Mobility:  Rolling: Total assistance  Supine to Sit: Total assistance  Sit to Supine: Total assistance  Scooting:  Total assistance  Balance:   Sitting: Impaired; With support  Sitting - Static: Poor (constant support)  Sitting - Dynamic: Poor (constant support)  Ambulation/Gait Training:   Pt is non-ambulatory     Pain:  Pt reports 0/10 pain or discomfort prior to treatment. Pt reports 0/10 pain or discomfort post treatment. Activity Tolerance:   Pt demonstrated decreased tolerance to activity. Please refer to the flowsheet for vital signs taken during this treatment. After treatment:   [ ]         Patient left in no apparent distress sitting up in chair  [X]         Patient left in no apparent distress in bed  [X]         Call bell left within reach  [ ]         Nursing notified  [X]         Caregiver present  [X]         Bed alarm activated      COMMUNICATION/EDUCATION:   [X]         Fall prevention education was provided and the patient/caregiver indicated understanding. [X]         Patient/family have participated as able in goal setting and plan of care. [X]         Patient/family agree to work toward stated goals and plan of care. [ ]         Patient understands intent and goals of therapy, but is neutral about his/her participation. [ ]         Patient is unable to participate in goal setting and plan of care.      Thank you for this referral.  Zahida Cummings, PT   Time Calculation: 14 mins

## 2017-04-12 NOTE — PROGRESS NOTES
Problem: Self Care Deficits Care Plan (Adult)  Goal: *Acute Goals and Plan of Care (Insert Text)  OCCUPATIONAL THERAPY EVALUATION/DISCHARGE     Patient: Vipin James (80 y.o. female)  Date: 4/12/2017  Primary Diagnosis: Weakness  Failure to thrive in adult  Precautions: fall         ASSESSMENT AND RECOMMENDATIONS:  Based on the objective data described below, the patient presents with . Skilled occupational therapy is not indicated at this time. Discharge Recommendations: None  Further Equipment Recommendations for Discharge: N/A       COMPLEXITY      Eval Complexity: History: LOW Complexity : Brief history review ; Examination: LOW Complexity : 1-3 performance deficits relating to physical, cognitive , or psychosocial skils that result in activity limitations and / or participation restrictions ; Decision Making:LOW Complexity : No comorbidities that affect functional and no verbal or physical assistance needed to complete eval tasks  Assessment: LOW Complexity          G-CODES:      Self Care  Current  CM= 80-99%   Goal  CM= 80-99%   D/C  CM= 80-99%. The severity rating is based on the Level of Assistance required for Functional Mobility and ADLs. SUBJECTIVE:   Patient stated my walker is in the other room.  she was unable to be re-oriented to place      OBJECTIVE DATA SUMMARY:       Past Medical History:   Diagnosis Date    Arthritis      Atypical chest pain      CAD (coronary artery disease)      Cardiac echocardiogram 11/13/2014     EF 60%. No RWMA. Mild-mod LVH. Gr 1 DDfx. High ventricular filling pressure. RVSP 45 mmHg. Mod-severe LAE. Mild MR.  Mild-mod TR.  Cardiac nuclear imaging test 12/14/2009     No evidence of ischemia or infarction. Mild prox inferior, inferoseptal hypk. EF 63%.  Carotid duplex 11/13/2014     Mild < 50% bilateral ICA stenosis. Significant LECA tortuousity.       [facility]      [facility] mellitus ([facility]      Dilated cardiomyopathy (HCC)       EF now improved to 58% by gated SPECT imaging in December 2005    Fibrotic [facility] diseases [facility]      Hypertension       Past Surgical History:   Procedure Laterality Date    BIOPSY OF BREAST, INCISIONAL         left breast     Prior Level of Function/Home Situation: she is wheel chair bound at home and has pain assistance during the day for her self care needs  Home Situation  Home Environment: Private residence  One/Two Story Residence: One story  Living Alone: No  Support Systems: Family member(s)  Patient Expects to be Discharged to[de-identified] Private residence  Current DME Used/Available at Home: Wheelchair  [X]     Right hand dominant       [ ]     Left hand dominant  Cognitive/Behavioral Status:   she is alert, oriented to self only   Skin: no skin integrity issues noted during OT evaluation  Edema: no extremity edema noted  Vision/Perceptual:     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     Coordination:   MARUAYSHA's WFL   Balance:   min to mod assist for sitting on the edge of the bed  Strength:  Bilateral  is 4/5; she was unable to fully follow through with formal MMT; remaining UE strength is equal to or plus than 3/5   Tone & Sensation:  MARUAYSHA's WFL; she does present with generalized trunk and LE stiffness which limits her functional mobility   Range of Motion:  MARIA DE JESUS's AROM is WFL; bilateral shoulder flexion 0 - 90'   Functional Mobility and Transfers for ADLs:  Bed Mobility:   supine to sit and return to supine requires total assist   Transfers:   unable   ADL Assessment:   she requires total assistance for her self care routine. She reports she can feed herself at home. Pain:  Pt reports unrated pain/discomfort prior to treatment. Pt reports unchanged unrated pain/discomfort post treatment.    Activity Tolerance:   No SOB noted; she is reporting fatigue with sitting on the edge of the bed for less than one minute  Please refer to the flowsheet for vital signs taken during this treatment. After treatment:   [ ]  Patient left in no apparent distress sitting up in chair  [X]  Patient left in no apparent distress in bed  [X]  Call bell left within reach  [ ]  Nursing notified  [ ]  Caregiver present  [ ]  Bed alarm activated      COMMUNICATION/EDUCATION:   Communication/Collaboration:  [ ]      Home safety education was provided and the patient/caregiver indicated understanding. [ ]      Patient/family have participated as able and agree with findings and recommendations. [X]      Patient is unable to participate in plan of care at this time.      Nicole Noe OTR/L  Time Calculation: 11 mins

## 2017-04-12 NOTE — ROUTINE PROCESS
Bedside and Verbal shift change report given to Shelley ALBA RN (oncoming nurse) by Triston Hunt (offgoing nurse). Report included the following information SBAR, Kardex and Recent Results.

## 2017-04-13 NOTE — ROUTINE PROCESS
Bedside and Verbal shift change report given to Liliam (oncoming nurse) by Estevan Garcia (offgoing nurse).  Report included the following information SBAR, Kardex, [facility] and Recent Results

## 2017-04-13 NOTE — PROGRESS NOTES
NUTRITION    Nutrition Screen      RECOMMENDATIONS / PLAN:     - Downgrade diet consistency to pureed  - Assistance with meals  - Continue RD inpatient monitoring and evaluation. NUTRITION INTERVENTIONS & DIAGNOSIS:     [x] Meals/Snacks: modified diet  [x] Medical food supplementation: Ensure Pudding TID    Nutrition Diagnosis:  Difficulty chewing related to missing teeth as evidenced by pt unable to eat some whole foods  Underweight related to inadequate energy intake as evidenced by BMI 14.4 kg/m^2    ASSESSMENT:     Subjective/Objective:  Per RN, pt continues to have difficulty chewing certain foods. Tolerating pureed/ mechanical soft type foods better. Average po intake adequate to meet patients estimated nutritional needs:   [] Yes     [x] No   [] Unable to determine at this time    Diet: DIET NUTRITIONAL SUPPLEMENTS Breakfast, Lunch, Dinner; ENSURE PUDDING  DIET DYSPHAGIA ADVANCED SOFT (NDD3)      Food Allergies:  None known   Current Appetite:   [x] Good     [] Fair     [] Poor     [] Other:  Appetite/meal intake prior to admission:   [x] Good     [] Fair     [] Poor     [] Other:  Feeding Limitations:  [] Swallowing difficulty    [x] Chewing difficulty: missing teeth    [] Other:  Current Meal Intake: No data found. BM: 4/13    Skin Integrity:  No pressure ulcer or wound  Edema:  None   Pertinent Medications: Reviewed: D5-1/2NS at 50 mL/hr (60 gm dextrose, 204 kcal)    Recent Labs      04/11/17   1600   NA  139   K  3.8   CL  105   CO2  29   GLU  100*   BUN  25*   CREA  1.23   CA  9.5   ALB  2.7*   SGOT  24   ALT  12*     No intake or output data in the 24 hours ending 04/13/17 1453    Anthropometrics:  Ht Readings from Last 1 Encounters:   04/11/17 5' 5\" (1.651 m)     Last 3 Recorded Weights in this Encounter    04/11/17 1126 04/12/17 0516 04/13/17 0651   Weight: 40.4 kg (89 lb) 39.3 kg (86 lb 9.6 oz) 41.5 kg (91 lb 8 oz)     Body mass index is 15.23 kg/(m^2).           Weight History:   No recent changes in weight PTA per daughter report. Noted 8 lb (8.5%) weight loss in past 2 months PTA per chart hx    Weight Metrics 4/13/2017 2/15/2017 7/19/2016 1/22/2016 12/22/2015 6/23/2015 5/20/2015   Weight 91 lb 8 oz 94 lb 9.6 oz 97 lb 86 lb 89 lb 99 lb 104 lb   BMI 15.23 kg/m2 15.74 kg/m2 16.14 kg/m2 14.31 kg/m2 14.37 kg/m2 15.99 kg/m2 16.79 kg/m2        Admitting Diagnosis: Weakness  Failure to thrive in adult  Past Medical History:   Diagnosis Date    Arthritis     Atypical chest pain     CAD (coronary artery disease)     Cardiac echocardiogram 11/13/2014    EF 60%. No RWMA. Mild-mod LVH. Gr 1 DDfx. High ventricular filling pressure. RVSP 45 mmHg. Mod-severe LAE. Mild MR.  Mild-mod TR.  Cardiac nuclear imaging test 12/14/2009    No evidence of ischemia or infarction. Mild prox inferior, inferoseptal hypk. EF 63%.  Carotid duplex 11/13/2014    Mild < 50% bilateral ICA stenosis. Significant LECA tortuousity.  CHF (congestive heart failure) (HCC)     [facility] [address]     Dilated cardiomyopathy (HCC)     EF now improved to 58% by gated SPECT imaging in December 2005    Fibrotic [facility] diseases ([facility]     Hypertension        Education Needs:        [x] None identified  [] Identified - Not appropriate at this time  []  Identified and addressed - refer to education log  Learning Limitations:   [x] None identified  [x] Identified    Cultural, Mosque & ethnic food preferences:  [x] None identified    [] Identified and addressed     ESTIMATED NUTRITION NEEDS:     Calories: 4898-5080 kcal (MSJx1.2-1.3) based on  [] Actual BW      [x] IBW: 57 kg  Protein: 46-57 gm (0.8-1 gm/kg) based on  [] Actual BW      [x] IBW   Fluid: 1 mL/kcal     MONITORING & EVALUATION:     Nutrition Goal(s):   1. Po intake of meals will meet >75% of patient estimated nutritional needs within the next 7 days.   Outcome:  [] Met/Ongoing    [x]  Not Met    [] New/Initial Goal     Monitoring:   [x] Diet tolerance   [x] Meal intake   [x] Supplement intake   [] GI symptoms/ability to tolerate po diet   [] Respiratory status   [] Plan of care      Previous Recommendations (for follow-up assessments only):     [x]   Implemented       []   Not Implemented (RD to address)     [] No Recommendation Made     Discharge Planning:  Regular diet; consistency as tolerates  [x] Participated in care planning, discharge planning, & interdisciplinary rounds as appropriate      Kayley Najera, [address] [place] [address]   Pager: [phone]

## 2017-04-13 NOTE — PROGRESS NOTES
Progress Note    Patient: Marvin Mims MRN: [id number]  SSN: [id number]    YOB: 1926  Age: 80 y.o. Sex: female      Admit Date: 4/11/2017    LOS: 2 days     Subjective:     Patient  With blindness admitted due to weakness with difficulty ambulating and FTT. Patient awake and agitiated today. Objective:     Vitals:    04/13/17 0400 04/13/17 0651 04/13/17 0800 04/13/17 1200   BP: 148/61  155/55 116/57   Pulse: (!) 48  72 87   Resp: 16  16 17   Temp: 97.7 °F (36.5 °C)  97.8 °F (36.6 °C) 97.7 °F (36.5 °C)   SpO2: 96%  95% 95%   Weight:  41.5 kg (91 lb 8 oz)     Height:            Intake and Output:  Current Shift:    Last three shifts: 04/11 1901 - 04/13 0700  In: 605 [I.V.:605]  Out: 0     Physical Exam:   GENERAL: alert, no distress, appears older than stated age  LUNG: clear to auscultation bilaterally  HEART: regular rate and rhythm, S1, S2 normal, no murmur, click, rub or gallop    Lab/Data Review: All lab results for the last 24 hours reviewed. Glucose 74    Assessment:     Active Problems:    Hypertension ()      Fibrotic lung diseases (HCC) ()      Weakness generalized (4/11/2017)      Inability to ambulate due to multiple joints (4/11/2017)      Weakness (4/11/2017)      Failure to thrive in adult (4/11/2017)        Plan:     Plan for discharge home with hospice once hospital bed available. Plan for discharge tomorrow.     Signed By: Arlyn Gilmore MD     April 13, 2017

## 2017-04-13 NOTE — CDMP QUERY
Per RD documentation \"Underweight related to inadequate energy intake as evidenced by BMI 14.4 kg/m^2\". Patient 5'5 and weight 86 lbs and albumin 2.7.    ____Yes agree underweight'  ____No not underweight       =>  =>Other Explanation of clinical findings  =>Unable to Determine (no explanation of clinical findings)      Please clarify and document your clinical opinion in the progress notes and discharge summary including the definitive and/or presumptive diagnosis, (suspected or probable), related to the above clinical findings. Please include clinical findings supporting your diagnosis. If you DECLINE this query or would like to communicate with [facility], please utilize the \"[employer] message box\" at the TOP of the Progress Note on the right.       Thank you,  Donna Ramirez [phone]

## 2017-04-14 NOTE — HOSPICE
[facility] referral received and appreciated. Chart review in progress; hospice RN to assess patient at bedside to determine if patient qualifies for hospice benefit as there is no obvious qualifying hospice Dx from chart review. Noted that plan is to discharge patient 4/15. Hospice RN to confer with Care Coordination and patient's caregivers to help coordinate care. Will update when more info available. Please call with any questions or concerns. UPDATE:  Hospice RN visited patient and daughter at bedside. Hospice philosophy and services discussed. Hospice RN assessed patient at [facility]. Per Hospice Medical Director, pt does qualify for hospice benefit under End Stage Cardiac Disease. DME to be delivered to home of Nabil Sanders has declined hospital bed at this time. Thank You for allowing us to participate in the care of this patient.      Peter Boydr, [address] [facility]   [phone] office  [phone] cell

## 2017-04-14 NOTE — DIABETES MGMT
GLYCEMIC CONTROL PLAN OF CARE    Pt without past medical history of DM but with hypoglycemia both yesterday and this morning. Pt has D5NS running in room at 50ml/hr. Diet downgraded today by Romana Pierini RD to pureed with ensure pudding with meals to aid with po intake. Recommend addition of hs snack to aid with FBG. Recent BG:  Results for Rafaela Gallardo (MRN [id number]) as of 4/14/2017 11:19   Ref. Range 4/13/2017 07:44 4/13/2017 11:59 4/13/2017 15:39 4/13/2017 21:09 4/14/2017 06:07   GLUCOSE,FAST - POC Latest Ref Range: 70 - 110 mg/dL 54 (L) 74 79 91 57 (L)     Will continue to monitor inpatient for intervention. Pt not oriented per OT and not appropriate for education.     Ora Marie MS, [address] [place] [address], E  Pager: [phone]

## 2017-04-14 NOTE — HOSPICE
---------------------------Please call [facility] [phone] office with patient transport time 4/15. Thank You.

## 2017-04-14 NOTE — PROGRESS NOTES
Plan is for pt to be discharged home with hospice. Hospice RN met with pt and family member in room. Physician is aware. DIscharge is anticipated for tomorrow.

## 2017-04-14 NOTE — PROGRESS NOTES
Progress Note    Patient: Joana Noble MRN: [id number]  SSN: [id number]    YOB: 1926  Age: 80 y.o. Sex: female      Admit Date: 4/11/2017    LOS: 3 days     Subjective:     Patient admitted due to weakness with FTT and underweight. Patient more alert. Plan for home with hospice. Objective:     Vitals:    04/14/17 0400 04/14/17 0800 04/14/17 1228 04/14/17 1527   BP: (!) 154/93 158/75 (!) 154/103 117/66   Pulse: (!) 57 94 (!) 58 70   Resp: 18 18 18 16   Temp: 96.9 °F (36.1 °C) (!) 94.4 °F (34.7 °C) 95 °F (35 °C) (!) 94.5 °F (34.7 °C)   SpO2: 100% 98% 95% 94%   Weight:       Height:            Intake and Output:  Current Shift:    Last three shifts: 04/12 1901 - 04/14 0700  In: 2545.8 [P.O.:120; I.V.:2425.8]  Out: -     Physical Exam:   GENERAL: alert, cooperative, no distress, appears older than stated age  LUNG: clear to auscultation bilaterally  HEART: regular rate and rhythm, S1, S2 normal, no murmur, click, rub or gallop    Lab/Data Review: All lab results for the last 24 hours reviewed. Glucose 57    Assessment:     Active Problems:    Hypertension ()      Fibrotic lung diseases (HCC) ()      Weakness generalized (4/11/2017)      Inability to ambulate due to multiple joints (4/11/2017)      Weakness (4/11/2017)      Failure to thrive in adult (4/11/2017)        Plan:     Increase IV fluid rate. Plan for discharge to home once hospital bed is available.     Signed By: Uriel Baires MD     April 14, 2017

## 2017-04-15 NOTE — DISCHARGE SUMMARY
Discharge Summary     Patient: Jayy Figueroa MRN: [id number]  SSN: [id number]    YOB: 1926  Age: 80 y.o. Sex: female       Admit Date: 4/11/2017    Discharge Date: 4/15/2017      Admission Diagnoses: Weakness  Failure to thrive in adult    Discharge Diagnoses:   Problem List as of 4/15/2017  Date Reviewed: 4/11/2017          Codes Class Noted - Resolved    CHF exacerbation ([facility] ICD-10-CM: I50.9  ICD-9-CM: 428.0  12/9/2012 - Present        Weakness generalized ICD-10-CM: R53.1  ICD-9-CM: 780.79  4/11/2017 - Present        Inability to ambulate due to multiple joints ICD-10-CM: R26.2  ICD-9-CM: 719.7  4/11/2017 - Present        Weakness ICD-10-CM: R53.1  ICD-9-CM: 780.79  4/11/2017 - Present        Failure to thrive in adult ICD-10-CM: R62.7  ICD-9-CM: 783.7  4/11/2017 - Present        CAP (community acquired pneumonia) ICD-10-CM: J18.9  ICD-9-CM: 765  2/10/2017 - Present        Complete heart block ([facility] ICD-10-CM: I44.2  ICD-9-CM: 426.0  5/20/2015 - Present        Syncope ICD-10-CM: R55  ICD-9-CM: 780.2  11/12/2014 - Present        SOB (shortness of breath) ICD-10-CM: R06.02  ICD-9-CM: 786.05  7/17/2013 - Present        Vaginal bleeding ICD-10-CM: N93.9  ICD-9-CM: 623.8  4/26/2013 - Present        CHF (congestive heart failure) (HCC) ICD-10-CM: I50.9  ICD-9-CM: 428.0  3/6/2013 - Present        Chronic diastolic heart failure ([facility] ICD-10-CM: I50.32  ICD-9-CM: 428.32  1/15/2013 - Present        Pulmonary hypertension,moderate ICD-10-CM: I27.2  ICD-9-CM: 416.8  1/15/2013 - Present        Dilated cardiomyopathy/EF now improved to 58% by gated SPECT imaging in December 2005.  ICD-10-CM: I42.0  ICD-9-CM: 425.4  Unknown - Present    Overview Signed 1/24/2011  9:46 AM by Young Vazquez     EF now improved to 58% by gated SPECT imaging in December 2005             Hypertension ICD-10-CM: I10  ICD-9-CM: 401.9  Unknown - Present        Fibrotic lung diseases ([facility] ICD-10-CM: J84.10  ICD-9-CM: 987 Unknown - Present        Atypical chest pain ICD-10-CM: R07.89  ICD-9-CM: 786.59  Unknown - Present               Discharge Condition: Stable    Hospital Course: Patient with underweight and protein calorie malnutrition with blindness. Patient admitted due to weakness and not able to sit up. Patient hydrated but family did not wish feeding tube or heroic measures. Patient being discharged to home with hospice. Consults: palliative    Significant Diagnostic Studies: labs: BMP CBC and microbiology: blood culture: negative and urine culture: negative    Disposition: home  With Hospice    Discharge Medications:   Current Discharge Medication List      CONTINUE these medications which have NOT CHANGED    Details   HYDROcodone-acetaminophen (NORCO) 5-325 mg per tablet Take 1 Tab by mouth three (3) times daily. prn pain      amLODIPine (NORVASC) 5 mg tablet TAKE ONE TABLET BY MOUTH ONCE DAILY  Qty: 30 Tab, Refills: 6      isosorbide dinitrate (ISORDIL) 20 mg tablet Take 1 Tab by mouth two (2) times a day. Qty: 60 Tab, Refills: 6    Associated Diagnoses: Chronic diastolic heart failure ([address] Dilated cardiomyopathy (HCC)      gabapentin (NEURONTIN) 300 mg capsule Take 300 mg by mouth three (3) times daily. timolol (TIMOPTIC-XR) 0.5 % ophthalmic gel-forming Administer 1 Drop to both eyes daily. diazepam (VALIUM) 5 mg tablet Take 5 mg by mouth once as needed. prn sleep      OXYGEN-AIR DELIVERY SYSTEMS 2 L by Nasal route as needed. for SOB      albuterol-ipratropium (DUO-NEB) 2.5 mg-0.5 mg/3 ml nebulizer solution 3 mL by Nebulization route every six (6) hours as needed.  prn wheezing         STOP taking these medications       levoFLOXacin (LEVAQUIN) 500 mg tablet Comments:   Reason for Stopping:               Activity: Activity as tolerated  Diet: Regular Diet  Wound Care: None needed    Follow-up Appointments   Procedures    FOLLOW UP VISIT Appointment in: Other (Specify) Will be on Hospice     Will be on Hospice     Standing Status:   Standing     Number of Occurrences:   1     Order Specific Question:   Appointment in     Answer:    Other (Specify)       Signed By: Familia Bansal MD     April 15, 2017

## 2017-04-15 NOTE — ROUTINE PROCESS
Bedside and Verbal shift change report given to César Kelley RN (oncoming nurse) by Marleni Payan RN (offgoing nurse). Report included the following information SBAR, Kardex and Recent Results.

## 2017-04-15 NOTE — DISCHARGE INSTRUCTIONS
[employer] Activation    Thank you for requesting access to [employer]. Please follow the instructions below to securely access and download your online medical record. [employer] allows you to send messages to your doctor, view your test results, renew your prescriptions, schedule appointments, and more. How Do I Sign Up? 1. In your internet browser, go to [email or web address]  2. Click on the First Time User? Click Here link in the Sign In box. You will be redirect to the New Member Sign Up page. 3. Enter your [employer] Access Code exactly as it appears below. You will not need to use this code after youve completed the sign-up process. If you do not sign up before the expiration date, you must request a new code. [employer] Access Code: Cooper Oconnor  Expires: 2017  3:11 PM (This is the date your [employer] access code will )    4. Enter the last four digits of your Social Security Number (xxxx) and Date of Birth (mm/dd/yyyy) as indicated and click Submit. You will be taken to the next sign-up page. 5. Create a [employer] ID. This will be your [employer] login ID and cannot be changed, so think of one that is secure and easy to remember. 6. Create a [employer] password. You can change your password at any time. 7. Enter your Password Reset Question and Answer. This can be used at a later time if you forget your password. 8. Enter your e-mail address. You will receive e-mail notification when new information is available in [address] 9. Click Sign Up. You can now view and download portions of your medical record. 10. Click the Download Summary menu link to download a portable copy of your medical information. Additional Information    If you have questions, please visit the Frequently Asked Questions section of the [employer] website at [email or web address] Remember, [employer] is NOT to be used for urgent needs. For medical emergencies, dial 911.       Patient armband removed and shredded    DISCHARGE SUMMARY from Nurse    The following personal items are in your possession at time of discharge:    Dental Appliances: None  Visual Aid: None     Home Medications: None  Jewelry: None  Clothing: None  Other Valuables: None             PATIENT INSTRUCTIONS:    After general anesthesia or intravenous sedation, for 24 hours or while taking prescription Narcotics:  · Limit your activities  · Do not drive and operate hazardous machinery  · Do not make important personal or business decisions  · Do  not drink alcoholic beverages  · If you have not urinated within 8 hours after discharge, please contact your surgeon on call. Report the following to your surgeon:  · Excessive pain, swelling, redness or odor of or around the surgical area  · Temperature over 100.5  · Nausea and vomiting lasting longer than 4 hours or if unable to take medications  · Any signs of decreased circulation or nerve impairment to extremity: change in color, persistent  numbness, tingling, coldness or increase pain  · Any questions        What to do at Home:  Recommended activity: Activity as tolerated, Hospice    If you experience any of the following symptoms chest pains, shortness of breath, fever, chills, elevated temperature greater than 100.9F, please follow up with nearest Emergency room. *  Please give a list of your current medications to your Primary Care Provider. *  Please update this list whenever your medications are discontinued, doses are      changed, or new medications (including over-the-counter products) are added. *  Please carry medication information at all times in case of emergency situations. These are general instructions for a healthy lifestyle:    No smoking/ No tobacco products/ Avoid exposure to second hand smoke    Surgeon General's Warning:  Quitting smoking now greatly reduces serious risk to your health.     Obesity, smoking, and sedentary lifestyle greatly increases your risk for illness    A healthy diet, regular physical exercise & weight monitoring are important for maintaining a healthy lifestyle    You may be retaining fluid if you have a history of heart failure or if you experience any of the following symptoms:  Weight gain of 3 pounds or more overnight or 5 pounds in a week, increased swelling in our hands or feet or shortness of breath while lying flat in bed. Please call your doctor as soon as you notice any of these symptoms; do not wait until your next office visit. Recognize signs and symptoms of STROKE:    F-face looks uneven    A-arms unable to move or move unevenly    S-speech slurred or non-existent    T-time-call 911 as soon as signs and symptoms begin-DO NOT go       Back to bed or wait to see if you get better-TIME IS BRAIN. Warning Signs of HEART ATTACK     Call 911 if you have these symptoms:   Chest discomfort. Most heart attacks involve discomfort in the center of the chest that lasts more than a few minutes, or that goes away and comes back. It can feel like uncomfortable pressure, squeezing, fullness, or pain.  Discomfort in other areas of the upper body. Symptoms can include pain or discomfort in one or both arms, the back, neck, jaw, or stomach.  Shortness of breath with or without chest discomfort.  Other signs may include breaking out in a cold sweat, nausea, or lightheadedness. Don't wait more than five minutes to call 911 - MINUTES MATTER! Fast action can save your life. Calling 911 is almost always the fastest way to get lifesaving treatment. Emergency Medical Services staff can begin treatment when they arrive -- up to an hour sooner than if someone gets to the hospital by car. The discharge information has been reviewed with the caregiver. The caregiver verbalized understanding. Discharge medications reviewed with the caregiver and appropriate educational materials and side effects teaching were provided.

## 2017-04-15 NOTE — PROGRESS NOTES
Discharge instructions given to family member verbally and written all questions answered IV, tele and armband discontinued    [phone] discharge home via medical transportation via stretcher acompained by family member

## 2017-04-15 NOTE — PROGRESS NOTES
Progress Note    Patient: Elaina Ellis MRN: [id number]  SSN: [id number]    YOB: 1926  Age: 80 y.o. Sex: female      Admit Date: 4/11/2017    LOS: 4 days     Subjective:     Patient admitted with FTT with severe protein calorie malnutrition and underweight. Patient hypoglycemic this morning. Objective:     Vitals:    04/15/17 0000 04/15/17 0400 04/15/17 0611 04/15/17 0800   BP: 184/85 142/72  154/70   Pulse: 69 66  60   Resp: 20 20  18   Temp: 97.4 °F (36.3 °C) 97.5 °F (36.4 °C)  97.8 °F (36.6 °C)   SpO2:       Weight:   42.5 kg (93 lb 9.6 oz)    Height:            Intake and Output:  Current Shift:    Last three shifts: 04/13 1901 - 04/15 0700  In: 800.8 [P.O.:120; I.V.:680.8]  Out: -     Physical Exam:   GENERAL: alert, cooperative, no distress, appears older than stated age  LUNG: clear to auscultation bilaterally  HEART: regular rate and rhythm, S1, S2 normal, no murmur, click, rub or gallop    Lab/Data Review: All lab results for the last 24 hours reviewed. Glucose 192  Was as low as 30    Assessment:     Active Problems:    Hypertension ()      Fibrotic lung diseases (HCC) ()      Weakness generalized (4/11/2017)      Inability to ambulate due to multiple joints (4/11/2017)      Weakness (4/11/2017)      Failure to thrive in adult (4/11/2017)        Plan:     Presently on D10  Will decrease to D5   Awaiting discharge to hospice.     Signed By: Malena Hanna MD     April 15, 2017

## 2017-04-15 NOTE — ROUTINE PROCESS
Bedside shift change report given to Kristina (oncoming nurse) by Shiv Mueller (offgoing nurse). Report included the following information SBAR, Intake/Output, Recent Results and Cardiac Rhythm . Loli Khan

## 2018-10-13 NOTE — ED NOTES
Hourly rounding performed. Pt sleeping comfortably in bed, NAD, AxOx2. Pt denies need for toileting, call bell in reach. chemotherapy

## 2019-05-03 NOTE — ED NOTES
Assumed care of patient at this time. Report received from Darcy Okeeferic Calvillo. 03-May-2019 05:25

## 2019-10-29 NOTE — IP AVS SNAPSHOT
Current Discharge Medication List  
  
Take these medications at their scheduled times Dose & Instructions Dispensing Information Comments Morning Noon Evening Bedtime  
 isosorbide dinitrate 20 mg tablet Commonly known as:  ISORDIL Your next dose is: Today, Tomorrow Other:  ____________ Dose:  20 mg Take 1 Tab by mouth two (2) times a day. Quantity:  60 Tab Refills:  6  
     
   
   
   
  
 levoFLOXacin 500 mg tablet Commonly known as:  Rachel Maori Your next dose is: Today, Tomorrow Other:  ____________ Dose:  500 mg Take 1 Tab by mouth daily. Quantity:  5 Tab Refills:  0 NEURONTIN 300 mg capsule Generic drug:  gabapentin Your next dose is: Today, Tomorrow Other:  ____________ Dose:  300 mg Take 300 mg by mouth three (3) times daily. Refills:  0  
     
   
   
   
  
 timolol 0.5 % ophthalmic gel-forming Commonly known as:  TIMOPTIC-XE Your next dose is: Today, Tomorrow Other:  ____________ Dose:  1 Drop Administer 1 Drop to both eyes daily. Refills:  0 Take these medications as needed Dose & Instructions Dispensing Information Comments Morning Noon Evening Bedtime  
 albuterol-ipratropium 2.5 mg-0.5 mg/3 ml Nebu Commonly known as:  Lonny Jona Your next dose is: Today, Tomorrow Other:  ____________ Dose:  3 mL  
3 mL by Nebulization route as needed for Wheezing. Refills:  0  
     
   
   
   
  
 diazePAM 5 mg tablet Commonly known as:  VALIUM Your next dose is: Today, Tomorrow Other:  ____________ Dose:  5 mg Take 5 mg by mouth once as needed. Refills:  0  
     
   
   
   
  
 VICODIN 5-500 mg per tablet Generic drug:  HYDROcodone-acetaminophen Your next dose is: Today, Tomorrow Other:  ____________ Take  by mouth as needed for Pain. Refills:  0 Take these medications as directed Dose & Instructions Dispensing Information Comments Morning Noon Evening Bedtime  
 amLODIPine 5 mg tablet Commonly known as:  Markie Ha Your next dose is: Today, Tomorrow Other:  ____________ TAKE ONE TABLET BY MOUTH ONCE DAILY Quantity:  30 Tab Refills:  6 Where to Get Your Medications Information about where to get these medications is not yet available ! Ask your nurse or doctor about these medications  
  levoFLOXacin 500 mg tablet Normal vision: sees adequately in most situations; can see medication labels, newsprint

## 2020-01-17 NOTE — MR AVS SNAPSHOT
LVM to notify the patient we received the prescription for Aimovig, and to see if she has any active prescription insurance. We will continue to follow up.    Visit Information Date & Time Provider Department Dept. Phone Encounter #  
 1/19/2017  2:20 PM Marly Liu MD [facility] [phone] [id number] Upcoming Health Maintenance Date Due DTaP/Tdap/Td series (1 - Tdap) 4/2/1947 ZOSTER VACCINE AGE 60> 4/2/1986 GLAUCOMA SCREENING Q2Y 4/2/1991 OSTEOPOROSIS SCREENING (DEXA) 4/2/1991 Pneumococcal 65+ Low/Medium Risk (1 of 2 - PCV13) 4/2/1991 MEDICARE YEARLY EXAM 4/2/1991 INFLUENZA AGE 9 TO ADULT 8/1/2016 Allergies as of 1/19/2017  Review Complete On: 1/19/2017 By: Marly Liu MD  
  
 Severity Noted Reaction Type Reactions Aspirin    Other (comments) Severe nosebleeds Other Medication    Hives Any CILLINS (HIVES) Current Immunizations  Never Reviewed No immunizations on file. Not reviewed this visit You Were Diagnosed With   
  
 Codes Comments Dilated cardiomyopathy ([facility]    -  Primary ICD-10-CM: I42.0 ICD-9-CM: 425. 4 Chronic diastolic heart failure (HCC)     ICD-10-CM: I50.32 
ICD-9-CM: 428.32 Fibrotic lung diseases ([facility]     ICD-10-CM: J84.10 ICD-9-CM: 686 Pulmonary hypertension ([facility]     ICD-10-CM: I27.2 ICD-9-CM: 416.8 SOB (shortness of breath)     ICD-10-CM: R06.02 
ICD-9-CM: 786.05 Vitals BP Pulse Height(growth percentile) SpO2 OB Status Smoking Status 130/60 74 5' 5\" (1.651 m) 98% Postmenopausal Former Smoker Vitals History Preferred Pharmacy Pharmacy Name Phone [facility], [address] [place] [address],[address] [place], [place] [address] [phone] Your Updated Medication List  
  
   
This list is accurate as of: 1/19/17  3:46 PM.  Always use your most recent med list.  
  
  
  
  
 albuterol-ipratropium 2.5 mg-0.5 mg/3 ml Nebu Commonly known as:  DUO-NEB  
3 mL by Nebulization route as needed for Wheezing. amLODIPine 5 mg tablet Commonly known as:  Myah Levin TAKE ONE TABLET BY MOUTH ONCE DAILY diazePAM 5 mg tablet Commonly known as:  VALIUM Take 5 mg by mouth once as needed. isosorbide dinitrate 20 mg tablet Commonly known as:  ISORDIL Take 1 Tab by mouth two (2) times a day. NEURONTIN 300 mg capsule Generic drug:  gabapentin Take 300 mg by mouth three (3) times daily. timolol 0.5 % ophthalmic gel-forming Commonly known as:  TIMOPTIC-XE Administer 1 Drop to both eyes daily. VICODIN 5-500 mg per tablet Generic drug:  HYDROcodone-acetaminophen Take  by mouth as needed for Pain. We Performed the Following AMB POC EKG ROUTINE W/ 12 LEADS, INTER & REP [53469 CPT(R)] Introducing [facility]! [facility] introduces [employer] patient portal. Now you can access parts of your medical record, email your doctor's office, and request medication refills online. 1. In your internet browser, go to [email or web address] 2. Click on the First Time User? Click Here link in the Sign In box. You will see the New Member Sign Up page. 3. Enter your [employer] Access Code exactly as it appears below. You will not need to use this code after youve completed the sign-up process. If you do not sign up before the expiration date, you must request a new code. · [employer] Access Code: [email or web address] Expires: 4/19/2017  2:11 PM 
 
4. Enter the last four digits of your Social Security Number (xxxx) and Date of Birth (mm/dd/yyyy) as indicated and click Submit. You will be taken to the next sign-up page. 5. Create a [employer] ID. This will be your [employer] login ID and cannot be changed, so think of one that is secure and easy to remember. 6. Create a [employer] password. You can change your password at any time. 7. Enter your Password Reset Question and Answer. This can be used at a later time if you forget your password. 8. Enter your e-mail address. You will receive e-mail notification when new information is available in [address] 9. Click Sign Up. You can now view and download portions of your medical record. 10. Click the Download Summary menu link to download a portable copy of your medical information. If you have questions, please visit the Frequently Asked Questions section of the [employer] website. Remember, [employer] is NOT to be used for urgent needs. For medical emergencies, dial 911. Now available from your iPhone and Android! Please provide this summary of care documentation to your next provider. Your primary care clinician is listed as [address] [place] [address]. If you have any questions after today's visit, please call [phone].